# Patient Record
Sex: FEMALE | Employment: FULL TIME | URBAN - METROPOLITAN AREA
[De-identification: names, ages, dates, MRNs, and addresses within clinical notes are randomized per-mention and may not be internally consistent; named-entity substitution may affect disease eponyms.]

---

## 2017-01-30 ENCOUNTER — OFFICE VISIT (OUTPATIENT)
Dept: FAMILY MEDICINE | Facility: CLINIC | Age: 37
End: 2017-01-30
Payer: COMMERCIAL

## 2017-01-30 VITALS
WEIGHT: 170.9 LBS | TEMPERATURE: 97.3 F | OXYGEN SATURATION: 100 % | HEART RATE: 100 BPM | HEIGHT: 63 IN | DIASTOLIC BLOOD PRESSURE: 80 MMHG | SYSTOLIC BLOOD PRESSURE: 119 MMHG | BODY MASS INDEX: 30.28 KG/M2

## 2017-01-30 DIAGNOSIS — B37.31 VULVOVAGINAL CANDIDIASIS: ICD-10-CM

## 2017-01-30 DIAGNOSIS — J01.90 ACUTE SINUSITIS WITH SYMPTOMS > 10 DAYS: ICD-10-CM

## 2017-01-30 DIAGNOSIS — R63.5 WEIGHT GAIN: Primary | ICD-10-CM

## 2017-01-30 PROCEDURE — 36415 COLL VENOUS BLD VENIPUNCTURE: CPT | Performed by: PHYSICIAN ASSISTANT

## 2017-01-30 PROCEDURE — 80061 LIPID PANEL: CPT | Performed by: PHYSICIAN ASSISTANT

## 2017-01-30 PROCEDURE — 84443 ASSAY THYROID STIM HORMONE: CPT | Performed by: PHYSICIAN ASSISTANT

## 2017-01-30 PROCEDURE — 99214 OFFICE O/P EST MOD 30 MIN: CPT | Performed by: PHYSICIAN ASSISTANT

## 2017-01-30 RX ORDER — FLUCONAZOLE 150 MG/1
150 TABLET ORAL ONCE
Qty: 1 TABLET | Refills: 0 | Status: SHIPPED | OUTPATIENT
Start: 2017-01-30 | End: 2017-01-30

## 2017-01-30 RX ORDER — DOXYCYCLINE 100 MG/1
100 CAPSULE ORAL 2 TIMES DAILY
Qty: 20 CAPSULE | Refills: 0 | Status: SHIPPED | OUTPATIENT
Start: 2017-01-30 | End: 2017-06-30

## 2017-01-30 NOTE — PROGRESS NOTES
SUBJECTIVE:                                                    Reyna Zhang is a 36 year old female who presents to clinic today for the following health issues:    Acute Illness   Acute illness concerns: Sinus infection  Onset: 2 weeks ago     Fever: no     Chills/Sweats: YES    Headache (location?): YES    Sinus Pressure:YES    Conjunctivitis:  no    Ear Pain: YES: both    Rhinorrhea: YES    Congestion: YES    Sore Throat: YES- only in the morning     Cough: YES    Wheeze: no     Decreased Appetite: YES    Nausea: no     Vomiting: no     Diarrhea:  no     Dysuria/Freq.: no     Fatigue/Achiness: YES    Sick/Strep Exposure: no      Therapies Tried and outcome: Benadryl and it helped a little bit a few nights ago     ## She also complains of weight gain of 30-40 pounds over the past     Questions/Concerns: Well check up question regarding blood work and fasting.      Problem list and histories reviewed & adjusted, as indicated.  Additional history: as documented    Patient Active Problem List   Diagnosis     Plantar warts     Carpal tunnel syndrome of left wrist     Neck pain     Cervical radiculopathy     Pain of left upper extremity     Pain of right upper extremity     History reviewed. No pertinent past surgical history.    Social History   Substance Use Topics     Smoking status: Never Smoker      Smokeless tobacco: Never Used     Alcohol Use: Yes      Comment: 0-1/week     Family History   Problem Relation Age of Onset     Depression Mother      Glaucoma Mother      Retinal detachment Mother      CANCER Father      Depression Father      Retinal detachment Father      DIABETES Sister      Depression Sister      Macular Degeneration No family hx of          Current Outpatient Prescriptions   Medication Sig Dispense Refill     amitriptyline (ELAVIL) 25 MG tablet Take 1 tablet (25 mg) by mouth At Bedtime 90 tablet 1     order for DME Equipment being ordered: carpal tunnel brace bilateral 2 Units 1  "    Allergies   Allergen Reactions     Macrobid [Nitrofurantoin]      Sulfa Drugs      Amoxicillin Rash       ROS:  C: + weight gain  INTEGUMENTARY/SKIN: NEGATIVE for worrisome rashes, moles or lesions  E/M: + sinus problems, runny nose and congestion  R: + for cough  CV: NEGATIVE for chest pain, palpitations or peripheral edema  GI: NEGATIVE for nausea, abdominal pain, heartburn, or change in bowel habits  : NEGATIVE for dysuria, hematuria, decreased urinary stream or discharge  MUSCULOSKELETAL: NEGATIVE for significant arthralgias or myalgia  NEURO: NEGATIVE for weakness, dizziness or paresthesias  ENDOCRINE: + for cold intolerance, NO HX diabetes and HX thyroid disease  HEME/ALLERGY/IMMUNE: NEGATIVE for swollen nodes      OBJECTIVE:                                                    /80 mmHg  Pulse 100  Temp(Src) 97.3  F (36.3  C) (Oral)  Ht 5' 3.39\" (1.61 m)  Wt 170 lb 14.4 oz (77.52 kg)  BMI 29.91 kg/m2  SpO2 100%  Body mass index is 29.91 kg/(m^2).  GENERAL: healthy, alert and no distress  EYES: Eyes grossly normal to inspection, PERRL and conjunctivae and sclerae normal  HENT: normal cephalic/atraumatic, ear canals and TM's normal, nasal mucosa edematous , oropharynx clear and oral mucous membranes moist  NECK: no adenopathy, no asymmetry, masses, or scars and thyroid normal to palpation  RESP: lungs clear to auscultation - no rales, rhonchi or wheezes  CV: regular rate and rhythm, normal S1 S2, no S3 or S4, no murmur, click or rub, no peripheral edema and peripheral pulses strong  ABDOMEN: soft, nontender, no hepatosplenomegaly, no masses and bowel sounds normal  MS: no gross musculoskeletal defects noted, no edema    Diagnostic Test Results:  No results found for this or any previous visit (from the past 24 hour(s)).     ASSESSMENT/PLAN:                                                    1. Weight gain  Patient's nutritionist suggests thyroid testing for weight gain, agree with plan.   - Lipid " Profile (Chol, Trig, HDL, LDL calc)  - TSH with free T4 reflex    2. Acute sinusitis with symptoms > 10 days  Push fluids and rest. Nasal rinses, humidified air at night.     - doxycycline (VIBRAMYCIN) 100 MG capsule; Take 1 capsule (100 mg) by mouth 2 times daily  Dispense: 20 capsule; Refill: 0    3. Vulvovaginal candidiasis  Patient states that she usually gets yeast infections with ABX usage. Advised that she take probiotic daily. Do NOT take meds prophylactically, take at the first sign of yeast infection.   - fluconazole (DIFLUCAN) 150 MG tablet; Take 1 tablet (150 mg) by mouth once for 1 dose  Dispense: 1 tablet; Refill: 0  I have discussed the patient's diagnosis, and my plan of treatment with the patient and/or family. Patient is aware to come back in with worsening symptoms or if no relief despite treatment plan.  Patient voiced understanding and had no further questions.     Shannon Lawson PA-C  [facility]

## 2017-01-30 NOTE — NURSING NOTE
"Chief Complaint   Patient presents with     Sinus Problem       Initial /80 mmHg  Pulse 100  Temp(Src) 97.3  F (36.3  C) (Oral)  Ht 5' 3.39\" (1.61 m)  Wt 170 lb 14.4 oz (77.52 kg)  BMI 29.91 kg/m2  SpO2 100% Estimated body mass index is 29.91 kg/(m^2) as calculated from the following:    Height as of this encounter: 5' 3.39\" (1.61 m).    Weight as of this encounter: 170 lb 14.4 oz (77.52 kg).  BP completed using cuff size: zackary Romero MA      "

## 2017-01-30 NOTE — MR AVS SNAPSHOT
"              After Visit Summary   1/30/2017    Reyna Zhang    MRN: [id number]           Patient Information     Date Of Birth          1980        Visit Information        Provider Department      1/30/2017 10:20 AM Shannon Lawson PA-C [facility]        Today's Diagnoses     Weight gain    -  1     Acute sinusitis with symptoms > 10 days         Vulvovaginal candidiasis            Follow-ups after your visit        Who to contact     If you have questions or need follow up information about today's clinic visit or your schedule please contact [facility] directly at [phone].  Normal or non-critical lab and imaging results will be communicated to you by [employer], letter or phone within 4 business days after the clinic has received the results. If you do not hear from us within 7 days, please contact the clinic through [employer] or phone. If you have a critical or abnormal lab result, we will notify you by phone as soon as possible.  Submit refill requests through [employer] or call your pharmacy and they will forward the refill request to us. Please allow 3 business days for your refill to be completed.          Additional Information About Your Visit        MyChart Information     HouseTab gives you secure access to your electronic health record. If you see a primary care provider, you can also send messages to your care team and make appointments. If you have questions, please call your primary care clinic.  If you do not have a primary care provider, please call [phone] and they will assist you.        Care EveryWhere ID     This is your Care EveryWhere ID. This could be used by other organizations to access your [place] medical records  [id number]        Your Vitals Were     Pulse Temperature Height BMI (Body Mass Index) Pulse Oximetry       100 97.3  F (36.3  C) (Oral) 5' 3.39\" (1.61 m) 29.91 kg/m2 100%        Blood Pressure from Last 3 Encounters: "   01/30/17 119/80   09/30/16 119/81   06/21/16 120/80    Weight from Last 3 Encounters:   01/30/17 170 lb 14.4 oz (77.52 kg)   09/30/16 180 lb (81.647 kg)   06/21/16 176 lb 9.6 oz (80.105 kg)              We Performed the Following     Lipid Profile (Chol, Trig, HDL, LDL calc)     TSH with free T4 reflex          Today's Medication Changes          These changes are accurate as of: 1/30/17 10:43 AM.  If you have any questions, ask your nurse or doctor.               Start taking these medicines.        Dose/Directions    doxycycline 100 MG capsule   Commonly known as:  VIBRAMYCIN   Used for:  Acute sinusitis with symptoms > 10 days   Started by:  Shannon Lawson PA-C        Dose:  100 mg   Take 1 capsule (100 mg) by mouth 2 times daily   Quantity:  20 capsule   Refills:  0       fluconazole 150 MG tablet   Commonly known as:  DIFLUCAN   Used for:  Vulvovaginal candidiasis   Started by:  Shannon Lawson PA-C        Dose:  150 mg   Take 1 tablet (150 mg) by mouth once for 1 dose   Quantity:  1 tablet   Refills:  0            Where to get your medicines      These medications were sent to [facility] [id number] [place] [address] AT [address] [address]     Phone:  [phone]    - doxycycline 100 MG capsule  - fluconazole 150 MG tablet             Primary Care Provider Office Phone # Fax #    DARIUS Diamond [phone]       [facility] [address]  [facility] [address]        Thank you!     Thank you for choosing [facility]  for your care. Our goal is always to provide you with excellent care. Hearing back from our patients is one way we can continue to improve our services. Please take a few minutes to complete the written survey that you may receive in the mail after your visit with us. Thank you!             Your Updated Medication List - Protect others around you: Learn how to safely  use, store and throw away your medicines at www.disposemymeds.org.          This list is accurate as of: 1/30/17 10:43 AM.  Always use your most recent med list.                   Brand Name Dispense Instructions for use    amitriptyline 25 MG tablet    ELAVIL    90 tablet    Take 1 tablet (25 mg) by mouth At Bedtime       doxycycline 100 MG capsule    VIBRAMYCIN    20 capsule    Take 1 capsule (100 mg) by mouth 2 times daily       fluconazole 150 MG tablet    DIFLUCAN    1 tablet    Take 1 tablet (150 mg) by mouth once for 1 dose       order for DME     2 Units    Equipment being ordered: carpal tunnel brace bilateral

## 2017-01-31 LAB
CHOLEST SERPL-MCNC: 156 MG/DL
HDLC SERPL-MCNC: 39 MG/DL
LDLC SERPL CALC-MCNC: 105 MG/DL
NONHDLC SERPL-MCNC: 117 MG/DL
TRIGL SERPL-MCNC: 58 MG/DL
TSH SERPL DL<=0.005 MIU/L-ACNC: 1.69 MU/L (ref 0.4–4)

## 2017-06-30 ENCOUNTER — NURSE TRIAGE (OUTPATIENT)
Dept: NURSING | Facility: CLINIC | Age: 37
End: 2017-06-30

## 2017-06-30 ENCOUNTER — OFFICE VISIT (OUTPATIENT)
Dept: FAMILY MEDICINE | Facility: CLINIC | Age: 37
End: 2017-06-30
Payer: COMMERCIAL

## 2017-06-30 VITALS
SYSTOLIC BLOOD PRESSURE: 108 MMHG | DIASTOLIC BLOOD PRESSURE: 66 MMHG | HEART RATE: 87 BPM | TEMPERATURE: 98.3 F | OXYGEN SATURATION: 98 % | BODY MASS INDEX: 26.42 KG/M2 | RESPIRATION RATE: 16 BRPM | WEIGHT: 151 LBS

## 2017-06-30 DIAGNOSIS — F40.243 FLYING PHOBIA: Primary | ICD-10-CM

## 2017-06-30 PROCEDURE — 99213 OFFICE O/P EST LOW 20 MIN: CPT | Performed by: NURSE PRACTITIONER

## 2017-06-30 RX ORDER — ALPRAZOLAM 0.25 MG
.25-.5 TABLET ORAL EVERY 6 HOURS PRN
Qty: 20 TABLET | Refills: 0 | Status: SHIPPED | OUTPATIENT
Start: 2017-06-30 | End: 2017-11-01

## 2017-06-30 NOTE — MR AVS SNAPSHOT
After Visit Summary   6/30/2017    Reyna Zhang    MRN: [id number]           Patient Information     Date Of Birth          1980        Visit Information        Provider Department      6/30/2017 4:00 PM Mamta Vale APRN CNP [facility]        Today's Diagnoses     Flying phobia    -  1      Care Instructions    1.  Xanax as needed for anxiety surrounding flying.  Do not drive after taking, causes drowsiness and impaired judgment.  Follow up in the office for refills.            Follow-ups after your visit        Who to contact     If you have questions or need follow up information about today's clinic visit or your schedule please contact [facility] directly at [phone].  Normal or non-critical lab and imaging results will be communicated to you by [employer]hart, letter or phone within 4 business days after the clinic has received the results. If you do not hear from us within 7 days, please contact the clinic through [employer] or phone. If you have a critical or abnormal lab result, we will notify you by phone as soon as possible.  Submit refill requests through [employer] or call your pharmacy and they will forward the refill request to us. Please allow 3 business days for your refill to be completed.          Additional Information About Your Visit        MyChart Information     [employer] gives you secure access to your electronic health record. If you see a primary care provider, you can also send messages to your care team and make appointments. If you have questions, please call your primary care clinic.  If you do not have a primary care provider, please call [phone] and they will assist you.        Care EveryWhere ID     This is your Care EveryWhere ID. This could be used by other organizations to access your [place] medical records  [id number]        Your Vitals Were     Pulse Temperature Respirations Pulse Oximetry BMI (Body Mass Index)        87 98.3  F (36.8  C) (Tympanic) 16 98% 26.42 kg/m2        Blood Pressure from Last 3 Encounters:   06/30/17 108/66   01/30/17 119/80   09/30/16 119/81    Weight from Last 3 Encounters:   06/30/17 151 lb (68.5 kg)   01/30/17 170 lb 14.4 oz (77.5 kg)   09/30/16 180 lb (81.6 kg)              Today, you had the following     No orders found for display         Today's Medication Changes          These changes are accurate as of: 6/30/17  4:27 PM.  If you have any questions, ask your nurse or doctor.               Start taking these medicines.        Dose/Directions    ALPRAZolam 0.25 MG tablet   Commonly known as:  XANAX   Used for:  Flying phobia   Started by:  Mamta Vale APRN CNP        Dose:  0.25-0.5 mg   Take 1-2 tablets (0.25-0.5 mg) by mouth every 6 hours as needed for anxiety   Quantity:  20 tablet   Refills:  0            Where to get your medicines      Some of these will need a paper prescription and others can be bought over the counter.  Ask your nurse if you have questions.     Bring a paper prescription for each of these medications     ALPRAZolam 0.25 MG tablet                Primary Care Provider Office Phone # Fax #    Juana DARIUS Calabrese [phone]       [facility] [address] [facility] [address]  [facility] [address]        Equal Access to Services     QUYNH CHOI AH: Hadkerimt gaspar Sochani, waaxda luqadaha, qaybta kaalmada adegertrudeyada, lam ryan. So [facility] [phone].    ATENCIÓN: Si habla español, tiene a martinez disposición servicios gratuitos de asistencia lingüística. Llame al [phone].    We comply with applicable federal civil rights laws and [place] laws. We do not discriminate on the basis of race, color, national origin, age, disability sex, sexual orientation or gender identity.            Thank you!     Thank you for choosing [facility]  for your care. Our goal is always to provide you with excellent  care. Hearing back from our patients is one way we can continue to improve our services. Please take a few minutes to complete the written survey that you may receive in the mail after your visit with us. Thank you!             Your Updated Medication List - Protect others around you: Learn how to safely use, store and throw away your medicines at www.disposemymeds.org.          This list is accurate as of: 6/30/17  4:27 PM.  Always use your most recent med list.                   Brand Name Dispense Instructions for use Diagnosis    ALPRAZolam 0.25 MG tablet    XANAX    20 tablet    Take 1-2 tablets (0.25-0.5 mg) by mouth every 6 hours as needed for anxiety    Flying phobia       amitriptyline 25 MG tablet    ELAVIL    90 tablet    Take 1 tablet (25 mg) by mouth At Bedtime    Cervicalgia

## 2017-06-30 NOTE — TELEPHONE ENCOUNTER
Seeking medication for anxiety/ she has to fly out tomorrow/transferred to scheduling  Jhon Arevalo RN [facility] [phone]

## 2017-06-30 NOTE — PATIENT INSTRUCTIONS
1.  Xanax as needed for anxiety surrounding flying.  Do not drive after taking, causes drowsiness and impaired judgment.  Follow up in the office for refills.

## 2017-06-30 NOTE — NURSING NOTE
"Chief Complaint   Patient presents with     Anxiety       Initial /66 (BP Location: Left arm, Patient Position: Chair, Cuff Size: Adult Regular)  Pulse 87  Temp 98.3  F (36.8  C) (Tympanic)  Resp 16  Wt 151 lb (68.5 kg)  SpO2 98%  BMI 26.42 kg/m2 Estimated body mass index is 26.42 kg/(m^2) as calculated from the following:    Height as of 1/30/17: 5' 3.39\" (1.61 m).    Weight as of this encounter: 151 lb (68.5 kg).  Medication Reconciliation: complete     Philly Demarco, CMA    "

## 2017-06-30 NOTE — PROGRESS NOTES
SUBJECTIVE:                                                    Reyna Zhang is a 37 year old female who presents to clinic today for the following health issues:      Anxiety when flying      Duration: years    Description (location/character/radiation): worsened with most recent trip to and from [place], returned yesterday    Intensity:  severe    History (similar episodes/previous evaluation): has been seen years ago for this    Precipitating or alleviating factors: None    Therapies tried and outcome: has taken Xanax 0.5mg in past, has been years     Anxiety symptoms actually started 2 nights prior to flight, was hard to sleep.  Felt keyed up, tense, had headache.  Hard to focus and calm down.  On the flight had symptoms of racing heart, thinking about all the things could go wrong, spinning out.  On the way back a good lora sitting next to her read her the travel magazine.    36h of travel planned in the next few days.  She travels a lot, works as a professor, goes to a lot of conferences.  Not from [place], travels to see family.  Last year was on 18 flights, did it without any meds.  [place] [facility] [place].    Traveling without kid, that is hard.  No anxiety symptoms outside of flying.  Not impacting work or home life.      Has been in therapy before.    Does not drink alcohol.    Patient Active Problem List   Diagnosis     Plantar warts     Carpal tunnel syndrome of left wrist     Neck pain     Cervical radiculopathy     Pain of left upper extremity     Pain of right upper extremity     History reviewed. No pertinent surgical history.    Social History   Substance Use Topics     Smoking status: Never Smoker     Smokeless tobacco: Never Used     Alcohol use Yes      Comment: 0-1/week     Family History   Problem Relation Age of Onset     Depression Mother      Glaucoma Mother      Retinal detachment Mother      CANCER Father      Depression Father      Retinal detachment Father      DIABETES Sister      Depression  Sister      Macular Degeneration No family hx of          Current Outpatient Prescriptions   Medication Sig Dispense Refill     ALPRAZolam (XANAX) 0.25 MG tablet Take 1-2 tablets (0.25-0.5 mg) by mouth every 6 hours as needed for anxiety 20 tablet 0     amitriptyline (ELAVIL) 25 MG tablet Take 1 tablet (25 mg) by mouth At Bedtime (Patient not taking: Reported on 6/30/2017) 90 tablet 1       ROS:  Psych as above, otherwise negative       OBJECTIVE:                                                    /66 (BP Location: Left arm, Patient Position: Chair, Cuff Size: Adult Regular)  Pulse 87  Temp 98.3  F (36.8  C) (Tympanic)  Resp 16  Wt 151 lb (68.5 kg)  SpO2 98%  BMI 26.42 kg/m2   GENERAL APPEARANCE: healthy, alert and no distress  EYES: Eyes grossly normal to inspection and conjunctivae and sclerae normal  RESP: respirations nonlabored  PSYCH: mentation appears normal and affect normal/bright        ASSESSMENT/PLAN:                                                    (F40.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  Reviewed dosing ad side effects of drowsiness and impaired judgment, she will not drive after taking.  Ok to use night before flying to help with anxiety as well.  Discussed needs follow up office visit for refills.          See Patient Instructions    Mamta Vale, [facility]    Patient Instructions   1.  Xanax as needed for anxiety surrounding flying.  Do not drive after taking, causes drowsiness and impaired judgment.  Follow up in the office for refills.

## 2017-11-01 ENCOUNTER — OFFICE VISIT (OUTPATIENT)
Dept: FAMILY MEDICINE | Facility: CLINIC | Age: 37
End: 2017-11-01
Payer: COMMERCIAL

## 2017-11-01 VITALS
BODY MASS INDEX: 25.78 KG/M2 | SYSTOLIC BLOOD PRESSURE: 110 MMHG | DIASTOLIC BLOOD PRESSURE: 78 MMHG | OXYGEN SATURATION: 99 % | TEMPERATURE: 98.4 F | HEART RATE: 103 BPM | WEIGHT: 147.3 LBS

## 2017-11-01 DIAGNOSIS — F40.243 FLYING PHOBIA: ICD-10-CM

## 2017-11-01 DIAGNOSIS — N62 HYPERTROPHY OF BREAST: ICD-10-CM

## 2017-11-01 DIAGNOSIS — Z23 NEED FOR PROPHYLACTIC VACCINATION AND INOCULATION AGAINST INFLUENZA: ICD-10-CM

## 2017-11-01 DIAGNOSIS — M54.2 NECK PAIN: Primary | ICD-10-CM

## 2017-11-01 PROCEDURE — 90471 IMMUNIZATION ADMIN: CPT | Performed by: NURSE PRACTITIONER

## 2017-11-01 PROCEDURE — 99214 OFFICE O/P EST MOD 30 MIN: CPT | Mod: 25 | Performed by: NURSE PRACTITIONER

## 2017-11-01 PROCEDURE — 90686 IIV4 VACC NO PRSV 0.5 ML IM: CPT | Performed by: NURSE PRACTITIONER

## 2017-11-01 RX ORDER — ALPRAZOLAM 0.25 MG
.25-.5 TABLET ORAL EVERY 6 HOURS PRN
Qty: 10 TABLET | Refills: 0 | Status: SHIPPED | OUTPATIENT
Start: 2017-11-01 | End: 2018-02-20

## 2017-11-01 NOTE — PROGRESS NOTES

## 2017-11-01 NOTE — NURSING NOTE
"Chief Complaint   Patient presents with     Consult     Discuss breast reduction surgery       Initial /78 (BP Location: Right arm, Patient Position: Chair, Cuff Size: Adult Regular)  Pulse 103  Temp 98.4  F (36.9  C) (Oral)  Wt 147 lb 4.8 oz (66.8 kg)  SpO2 99%  BMI 25.78 kg/m2 Estimated body mass index is 25.78 kg/(m^2) as calculated from the following:    Height as of 1/30/17: 5' 3.39\" (1.61 m).    Weight as of this encounter: 147 lb 4.8 oz (66.8 kg).  Medication Reconciliation: complete     Kasey Downing CMA    "

## 2017-11-01 NOTE — PROGRESS NOTES
SUBJECTIVE:   Reyna Zhang is a 37 year old female who presents to clinic today for the following health issues:    Would like to discuss possible breast reduction surgery.  - Has had bilateral arm weakness and numbness tingling in fingers for many years, neck pain on and off, and new mid-back pain. Was first treated for bilateral carpal tunnel through [facility] with physical therapy and sports medicine for at least 8 years, which did not improve bilateral arm weakness and paresthesia in hands. In April 2016  began physical therapy for neck pain and radiculopathy, which somewhat improved symptoms. She continued physical therapy for at least 6 months, and was discharged from therapy. Physical therapist noted that her shoulder blades were splayed apart, and conjectured this was due to her large breasts. Neck pain is now stable, as long as she refrains from physical activity, but has noticed daily tightness and soreness in bilateral trapezius and in mid back in parathoracic muscles.   Has reduced weight from 170 to 147 over the past 11 months by working intentionally with a nutritionist. Currently wearing between a 36 GG and a 34 K size bra.    Has difficulty with many physical activities due to pain. Difficulty running, jump rope, biking  and playing soccer.  Has fear of flying- has taken xanax with flights with relief in the past, and would like a refill due to upcoming vacations over [facility],      Problem list and histories reviewed & adjusted, as indicated.  Additional history: as documented    Patient Active Problem List   Diagnosis     Plantar warts     Carpal tunnel syndrome of left wrist     Neck pain     Cervical radiculopathy     Pain of left upper extremity     Pain of right upper extremity     No past surgical history on file.    Social History   Substance Use Topics     Smoking status: Never Smoker     Smokeless tobacco: Never Used     Alcohol use Yes      Comment: 0-1/week     Family  History   Problem Relation Age of Onset     Depression Mother      Glaucoma Mother      Retinal detachment Mother      CANCER Father      Depression Father      Retinal detachment Father      DIABETES Sister      Depression Sister      Macular Degeneration No family hx of              Reviewed and updated as needed this [facility]  Meds       Reviewed and updated as needed this visit by Provider         ROS:  Constitutional, HEENT, cardiovascular, pulmonary, gi and gu systems are negative, except as otherwise noted.      OBJECTIVE:   /78 (BP Location: Right arm, Patient Position: Chair, Cuff Size: Adult Regular)  Pulse 103  Temp 98.4  F (36.9  C) (Oral)  Wt 147 lb 4.8 oz (66.8 kg)  SpO2 99%  BMI 25.78 kg/m2  Body mass index is 25.78 kg/(m^2).   GENERAL: healthy, alert and no distress  NECK: no adenopathy, no asymmetry, masses, or scars and thyroid normal to palpation  RESP: lungs clear to auscultation - no rales, rhonchi or wheezes  BREAST: large breasts for height; breast exam not performed  CV: regular rate and rhythm, normal S1 S2, no S3 or S4, no murmur, click or rub, no peripheral edema and peripheral pulses strong  MS: neck exam shows normal strength, no torticollis and ROM is normal and spine exam shows tenderness at parathoracic muscles, hunched posture  Very tight trapezius muscles bilaterally.  Diagnostic Test Results:  No results found for this or any previous visit (from the past 24 hour(s)).    ASSESSMENT/PLAN:     Problem List Items Addressed This Visit     Neck pain - Primary      Other Visit Diagnoses     Hypertrophy of breast        Relevant Orders    BREAST CENTER REFERRAL    Flying phobia        Relevant Medications    ALPRAZolam (XANAX) 0.25 MG tablet    Need for prophylactic vaccination and inoculation against influenza        Relevant Orders    FLU VAC, SPLIT VIRUS IM > 3 YO (QUADRIVALENT) [94764] (Completed)    Vaccine Administration, Initial [95318]  (Completed)         Discussed having her see a breast surgeon to discuss possibility of reduction. Given she has already had months of physical therapy and been discharged, lost a significant amount of weight, and still has large breasts for her frame, I think it would be reasonable to assume that her lerge breasts may be exacerbating her upper back and neck pain, and that she may benefit from breast reduction.  DARIUS Diamond [facility]  Please note greater than 50% of this 30 minute appointment were spent in counseling with the patient of the issues described above in the history of present illness and in the plan, including collecting history

## 2017-11-01 NOTE — MR AVS SNAPSHOT
After Visit Summary   11/1/2017    Reyna Zhang    MRN: [id number]           Patient Information     Date Of Birth          1980        Visit Information        Provider Department      11/1/2017 2:00 PM Juana Ro APRN CNP [facility]        Today's Diagnoses     Neck pain    -  1    Hypertrophy of breast        Flying phobia           Follow-ups after your visit        Additional Services     BREAST CENTER REFERRAL       Your provider has referred you to: FMG: [facility] [place] [facility] [place] [phone]   [email or web address]/  FMG: [facility] [phone]   [email or web address]  FMG: [facility] [phone]   [email or web address]/  FMG: [facility] [phone]   [email or web address]/Clinics/[facility]/  [facility]: [facility] Center at [facility] [phone]   [email or web address]    Please be aware that coverage of these services is subject to the terms and limitations of your health insurance plan.  Call member services at your health plan with any benefit or coverage questions.      Please bring the following with you to your appointment:    (1) Any X-Rays, CTs or MRIs which have been performed.  Contact the facility where they were done to arrange for  prior to your scheduled appointment.    (2) List of current medications   (3) This referral request   (4) Any documents/labs given to you for this referral                  Who to contact     If you have questions or need follow up information about today's clinic visit or your schedule please contact [facility] directly at  [phone].  Normal or non-critical lab and imaging results will be communicated to you by DocASAPhart, letter or phone within 4 business days after the clinic has received the results. If you do not hear from us within 7 days, please contact the clinic through DocASAPhart or phone. If you have a critical or abnormal lab result, we will notify you by phone as soon as possible.  Submit refill requests through [employer] or call your pharmacy and they will forward the refill request to us. Please allow 3 business days for your refill to be completed.          Additional Information About Your Visit        DocASAPhart Information     [employer] gives you secure access to your electronic health record. If you see a primary care provider, you can also send messages to your care team and make appointments. If you have questions, please call your primary care clinic.  If you do not have a primary care provider, please call [phone] and they will assist you.        Care EveryWhere ID     This is your Care EveryWhere ID. This could be used by other organizations to access your [place] medical records  [id number]        Your Vitals Were     Pulse Temperature Pulse Oximetry BMI (Body Mass Index)          103 98.4  F (36.9  C) (Oral) 99% 25.78 kg/m2         Blood Pressure from Last 3 Encounters:   11/01/17 110/78   06/30/17 108/66   01/30/17 119/80    Weight from Last 3 Encounters:   11/01/17 147 lb 4.8 oz (66.8 kg)   06/30/17 151 lb (68.5 kg)   01/30/17 170 lb 14.4 oz (77.5 kg)              We Performed the Following     BREAST CENTER REFERRAL          Today's Medication Changes          These changes are accurate as of: 11/1/17  2:38 PM.  If you have any questions, ask your nurse or doctor.               Stop taking these medicines if you haven't already. Please contact your care team if you have questions.     amitriptyline 25 MG tablet   Commonly known as:  ELAVIL                Where to get your medicines      Some of these will  need a paper prescription and others can be bought over the counter.  Ask your nurse if you have questions.     Bring a paper prescription for each of these medications     ALPRAZolam 0.25 MG tablet                Primary Care Provider Office Phone # Fax #    DARIUS Diamond CHRISTOS [phone]       [address] [facility] [address]  [facility] [address]        Equal Access to Services     [facility] STEPH : Hadii aad ku hadasho Soomaali, waaxda luqadaha, qaybta kaalmada adeegyada, waxay idiin hayaan adeeg kharash la'aan . So [facility] [phone].    ATENCIÓN: Si habla espalpesh, tiene a martinez disposición servicios gratuitos de asistencia lingüística. Tamara al [phone].    We comply with applicable federal civil rights laws and [place] laws. We do not discriminate on the basis of race, color, national origin, age, disability, sex, sexual orientation, or gender identity.            Thank you!     Thank you for choosing [facility]  for your care. Our goal is always to provide you with excellent care. Hearing back from our patients is one way we can continue to improve our services. Please take a few minutes to complete the written survey that you may receive in the mail after your visit with us. Thank you!             Your Updated Medication List - Protect others around you: Learn how to safely use, store and throw away your medicines at www.disposemymeds.org.          This list is accurate as of: 11/1/17  2:38 PM.  Always use your most recent med list.                   Brand Name Dispense Instructions for use Diagnosis    ALPRAZolam 0.25 MG tablet    XANAX    10 tablet    Take 1-2 tablets (0.25-0.5 mg) by mouth every 6 hours as needed for anxiety    Flying phobia

## 2017-11-29 ENCOUNTER — OFFICE VISIT (OUTPATIENT)
Dept: FAMILY MEDICINE | Facility: CLINIC | Age: 37
End: 2017-11-29
Payer: COMMERCIAL

## 2017-11-29 VITALS
RESPIRATION RATE: 18 BRPM | HEART RATE: 110 BPM | OXYGEN SATURATION: 98 % | WEIGHT: 150 LBS | DIASTOLIC BLOOD PRESSURE: 72 MMHG | TEMPERATURE: 97.9 F | SYSTOLIC BLOOD PRESSURE: 104 MMHG | BODY MASS INDEX: 26.25 KG/M2

## 2017-11-29 DIAGNOSIS — J01.01 ACUTE RECURRENT MAXILLARY SINUSITIS: Primary | ICD-10-CM

## 2017-11-29 DIAGNOSIS — J18.9 PNEUMONIA DUE TO INFECTIOUS ORGANISM, UNSPECIFIED LATERALITY, UNSPECIFIED PART OF LUNG: ICD-10-CM

## 2017-11-29 PROCEDURE — 99213 OFFICE O/P EST LOW 20 MIN: CPT | Performed by: NURSE PRACTITIONER

## 2017-11-29 RX ORDER — DOXYCYCLINE 100 MG/1
100 CAPSULE ORAL 2 TIMES DAILY
Qty: 20 CAPSULE | Refills: 0 | Status: SHIPPED | OUTPATIENT
Start: 2017-11-29 | End: 2018-02-06

## 2017-11-29 RX ORDER — FLUCONAZOLE 150 MG/1
150 TABLET ORAL ONCE
Qty: 2 TABLET | Refills: 0 | Status: SHIPPED | OUTPATIENT
Start: 2017-11-29 | End: 2017-11-29

## 2017-11-29 NOTE — PROGRESS NOTES
"  SUBJECTIVE:   Reyna Zhang is a 37 year old female who presents to clinic today for the following health issues:    Acute Illness   Acute illness concerns: head cold  Onset: x 10 days    Fever: low grade fever  degrees    Chills/Sweats: \"temperature off\"    Headache (location?): no    Sinus Pressure:YES    Conjunctivitis:  no    Ear Pain: pressure    Rhinorrhea: YES    Congestion: YES    Sore Throat: YES- feels like it's due to nasal drip     Cough: YES    Wheeze: no    Decreased Appetite: some days    Nausea: no    Vomiting: no    Diarrhea:  no    Dysuria/Freq.: no    Fatigue/Achiness: YES    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  Additional history: as documented    Patient Active Problem List   Diagnosis     Plantar warts     Carpal tunnel syndrome of left wrist     Neck pain     Cervical radiculopathy     Pain of left upper extremity     Pain of right upper extremity     History reviewed. No pertinent surgical history.    Social History   Substance Use Topics     Smoking status: Never Smoker     Smokeless tobacco: Never Used     Alcohol use Yes      Comment: 0-1/week     Family History   Problem Relation Age of Onset     Depression Mother      Glaucoma Mother      Retinal detachment Mother      CANCER Father      Depression Father      Retinal detachment Father      DIABETES Sister      Depression Sister      Macular Degeneration No family hx of              Reviewed and updated as needed this visit by clinical staffTobacco  Allergies  Meds  Med Hx  Surg Hx  Fam Hx  Soc Hx      Reviewed and updated as needed this visit by Provider         ROS:  Constitutional, HEENT, cardiovascular, pulmonary, GI, , musculoskeletal, neuro, skin, endocrine and psych systems are negative, except as " otherwise noted.      OBJECTIVE:   /72  Pulse 110  Temp 97.9  F (36.6  C) (Oral)  Resp 18  Wt 150 lb (68 kg)  LMP 11/10/2017 (Approximate)  SpO2 98%  BMI 26.25 kg/m2  Body mass index is 26.25 kg/(m^2).   GENERAL: healthy, alert and no distress  HENT: normal cephalic/atraumatic, ear canals and TM's normal, nasal mucosa edematous , oropharynx clear, oral mucous membranes moist and sinuses: maxillary tenderness on right  NECK: bilateral anterior cervical adenopathy, no asymmetry, masses, or scars and thyroid normal to palpation  RESP: lungs clear to auscultation - no rales, rhonchi or wheezes  CV: regular rate and rhythm, normal S1 S2, no S3 or S4, no murmur, click or rub, no peripheral edema and peripheral pulses strong  MS: no gross musculoskeletal defects noted, no edema    Diagnostic Test Results:  none     ASSESSMENT/PLAN:     Problem List Items Addressed This Visit     RESOLVED: Pneumonia    Relevant Medications    doxycycline (VIBRAMYCIN) 100 MG capsule    fluconazole (DIFLUCAN) 150 MG tablet      Other Visit Diagnoses     Acute recurrent maxillary sinusitis    -  Primary    Relevant Medications    doxycycline (VIBRAMYCIN) 100 MG capsule    fluconazole (DIFLUCAN) 150 MG tablet           DARIUS Diamond CNP  [facility]

## 2017-11-29 NOTE — MR AVS SNAPSHOT
After Visit Summary   11/29/2017    Reyna Zhang    MRN: [id number]           Patient Information     Date Of Birth          1980        Visit Information        Provider Department      11/29/2017 3:30 PM Juana Ro APRN [facility]        Today's Diagnoses     Acute recurrent maxillary sinusitis    -  1       Follow-ups after your visit        Your next 10 appointments already scheduled     Feb 06, 2018  1:00 PM CST   (Arrive by 12:45 PM)   New Plastic Surgery with Hannah Ramires MD   [facility] ([facility] [place])    [address]  [address] [facility] [address] [phone]           Do not wear perfume.              Who to contact     If you have questions or need follow up information about today's clinic visit or your schedule please contact [facility] directly at [phone].  Normal or non-critical lab and imaging results will be communicated to you by MyChart, letter or phone within 4 business days after the clinic has received the results. If you do not hear from us within 7 days, please contact the clinic through [employer]com[employer] or phone. If you have a critical or abnormal lab result, we will notify you by phone as soon as possible.  Submit refill requests through [employer] or call your pharmacy and they will forward the refill request to us. Please allow 3 business days for your refill to be completed.          Additional Information About Your Visit        MyChart Information     [employer] gives you secure access to your electronic health record. If you see a primary care provider, you can also send messages to your care team and make appointments. If you have questions, please call your primary care clinic.  If you do not have a primary care provider, please call [phone] and they will assist you.        Care EveryWhere ID     This is your Care EveryWhere ID. This could  be used by other organizations to access your [place] medical records  [id number]        Your Vitals Were     Pulse Temperature Respirations Last Period Pulse Oximetry BMI (Body Mass Index)    110 97.9  F (36.6  C) (Oral) 18 11/10/2017 (Approximate) 98% 26.25 kg/m2       Blood Pressure from Last 3 Encounters:   11/29/17 104/72   11/01/17 110/78   06/30/17 108/66    Weight from Last 3 Encounters:   11/29/17 150 lb (68 kg)   11/01/17 147 lb 4.8 oz (66.8 kg)   06/30/17 151 lb (68.5 kg)              Today, you had the following     No orders found for display         Today's Medication Changes          These changes are accurate as of: 11/29/17  3:32 PM.  If you have any questions, ask your nurse or doctor.               Start taking these medicines.        Dose/Directions    doxycycline 100 MG capsule   Commonly known as:  VIBRAMYCIN   Used for:  Acute recurrent maxillary sinusitis   Started by:  Juana Ro APRN CNP        Dose:  100 mg   Take 1 capsule (100 mg) by mouth 2 times daily   Quantity:  20 capsule   Refills:  0       fluconazole 150 MG tablet   Commonly known as:  DIFLUCAN   Used for:  Acute recurrent maxillary sinusitis   Started by:  Juana Ro APRN CNP        Dose:  150 mg   Take 1 tablet (150 mg) by mouth once for 1 dose   Quantity:  2 tablet   Refills:  0            Where to get your medicines      These medications were sent to [facility] [address] [place] [address] AT [address] [address]     Phone:  [phone]     doxycycline 100 MG capsule    fluconazole 150 MG tablet                Primary Care Provider Office Phone # Fax #    DARIUS Diamond [phone]       [address]  [facility] [address]        Equal Access to Services     QUYNH CHOI AH: Isaura Núñez, wajamaal luqadaha, qaybta kaalmada sheyla, lam ryan. So [facility]  [phone].    ATENCIÓN: Si kyrie sharma, tiene a martinez disposición servicios gratuitos de asistencia lingüística. Tamara zheng [phone].    We comply with applicable federal civil rights laws and [place] laws. We do not discriminate on the basis of race, color, national origin, age, disability, sex, sexual orientation, or gender identity.            Thank you!     Thank you for choosing [facility]  for your care. Our goal is always to provide you with excellent care. Hearing back from our patients is one way we can continue to improve our services. Please take a few minutes to complete the written survey that you may receive in the mail after your visit with us. Thank you!             Your Updated Medication List - Protect others around you: Learn how to safely use, store and throw away your medicines at www.disposemymeds.org.          This list is accurate as of: 11/29/17  3:32 PM.  Always use your most recent med list.                   Brand Name Dispense Instructions for use Diagnosis    ALPRAZolam 0.25 MG tablet    XANAX    10 tablet    Take 1-2 tablets (0.25-0.5 mg) by mouth every 6 hours as needed for anxiety    Flying phobia       doxycycline 100 MG capsule    VIBRAMYCIN    20 capsule    Take 1 capsule (100 mg) by mouth 2 times daily    Acute recurrent maxillary sinusitis       fluconazole 150 MG tablet    DIFLUCAN    2 tablet    Take 1 tablet (150 mg) by mouth once for 1 dose    Acute recurrent maxillary sinusitis

## 2017-11-29 NOTE — NURSING NOTE
"Chief Complaint   Patient presents with     URI     head cold       Initial /72  Pulse 110  Temp 97.9  F (36.6  C) (Oral)  Resp 18  Wt 150 lb (68 kg)  LMP 11/10/2017 (Approximate)  SpO2 98%  BMI 26.25 kg/m2 Estimated body mass index is 26.25 kg/(m^2) as calculated from the following:    Height as of 1/30/17: 5' 3.39\" (1.61 m).    Weight as of this encounter: 150 lb (68 kg).  Medication Reconciliation: complete     Priti Fay MA      "

## 2018-02-06 ENCOUNTER — OFFICE VISIT (OUTPATIENT)
Dept: PLASTIC SURGERY | Facility: CLINIC | Age: 38
End: 2018-02-06
Payer: COMMERCIAL

## 2018-02-06 ENCOUNTER — MYC MEDICAL ADVICE (OUTPATIENT)
Dept: FAMILY MEDICINE | Facility: CLINIC | Age: 38
End: 2018-02-06

## 2018-02-06 VITALS
BODY MASS INDEX: 28.74 KG/M2 | SYSTOLIC BLOOD PRESSURE: 132 MMHG | HEIGHT: 62 IN | HEART RATE: 104 BPM | WEIGHT: 156.2 LBS | DIASTOLIC BLOOD PRESSURE: 86 MMHG | OXYGEN SATURATION: 100 %

## 2018-02-06 DIAGNOSIS — N62 SYMPTOMATIC MAMMARY HYPERTROPHY: Primary | ICD-10-CM

## 2018-02-06 ASSESSMENT — PAIN SCALES - GENERAL: PAINLEVEL: NO PAIN (0)

## 2018-02-06 NOTE — PROGRESS NOTES
"PLASTICS NEW    This is a 37 year old female here to discuss possible breast reduction.  She found us online and is also plugged into the [employer] system.   While she considers herself gender queer, she is not transitioning and is only interested in breast reduction that matches her body habitus.    She wears a fitted bra with wider straps that is HH cup, and wears a K size sports bra.   For any increased activity, she wears a sports bra plus a tight compression shirt, and she uses a bra at nighttime as well.    Despite this added support, she continues to suffer from neck, shoulder and mid-back pain. She also experiences chronic shoulder/arm pain and hand numbness. She has seen physical therapy, sports medicine, chiropractic providers for these complaints but the symptoms persist and they are now wondering if this may be due to the traction from her large pendulous breasts and bra straps. She had an MRI of the neck which showed some C5-6 impingement, but no history of trauma. She has had a normal EMG to rule out carpal tunnel.     For relief, she tries using Magnesium baths, Tiger Blam, Biofreeze, heating pads and limited NSAIDs. She has also been on Gabapentin and Amitryptiline.  She also suffers from increased sweating and rashes in the IMF and regularly wipes this area throughout the day to reduce skin problems.    Her breast size interferes with her previous physical activities such as running, climbing and bicycling.   She has basically been waiting to see someone about breast reduction for the past 20 years.    PMH: symptomatic breast hypertrophy. Denies breast problems. Has some flight anxiety that she takes Xanax for. She has a tentative diagnosis of \"adjustment disorder\". She was actually quite tearful during the visit today about fear of GA.   She denies diabetes, HTN, GERD, bleeding/clotting or healing/scarring problems.   9 year old son - breast fed.    PSH: 3rd molar extractions age 14.     FHX: no " "breast or ovarian CA. Dad and PGF - prostate CA. Sister- depression, obesity. Mom- cardiac, ?PFO corrected with \"patch\" during adulthood, CABG, anxiety. Dad - cardiac stent, depression.  Mental health issues on both sides.    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 Male partner Derek - [employer] and PhD student at [employer]. Non-smoker, minimal ETOH. Diet - vegetarian (grew up in [place]). Eats legumes, tofu, fish, dairy, eggs. Does not drink coffee, some caffeinated teas. Exercise - still tries to play soccer and roller derby. Sleep - averages 8 hours per night, 9 hours would be ideal.    PE: 5' 2.5\", 155 lbs. BSA = 1.75. Schnur scale = 405 gms. Biological female.   Bilateral breast hypertrophy, left at least 200-300 gms larger. Grade 2 ptosis. Very dense tissues on palpation - no masses or nodes. Mild lateral thoracic rolls. Right IMF about 1 cms lower. SN-NAC: 30 cms on left, 28 cms on right. IMF-NAC: 16 cms on left, 14 cms on right. Photos taken with written consent.    A/P: biological female with symptomatic breast hypertrophy. Good candidate for bilateral breast reduction. Estimate at least 400 gms to be removed. Patient will continue to think about desired size. Will need preop mammogram. Understands that will need to submit additional supporting documentation of symptoms for prior auth. Has [employer] so may be additional criteria or slightly different weight removal requirement.  Hoping for surgery in early summer after classes are out.     Total time = 60 minutes, greater than half spent on discussing surgical options, insurance issues and a lot about her fear of anesthesia and difficulty in determining desired breast size.     Any discussions about risks and complications, periop cares and limitations will be discussed at a preop visit within 30 days of surgical date. OK to have partner attend preop visit.   "

## 2018-02-06 NOTE — MR AVS SNAPSHOT
"              After Visit Summary   2/6/2018    Reyna Zhang    MRN: [id number]           Patient Information     Date Of Birth          1980        Visit Information        Provider Department      2/6/2018 1:00 PM Hannah Ramires MD [facility] Plastic and Reconstructive Surgery        Today's Diagnoses     Symptomatic mammary hypertrophy    -  1       Follow-ups after your visit        Who to contact     Please call your clinic at [phone] to:    Ask questions about your health    Make or cancel appointments    Discuss your medicines    Learn about your test results    Speak to your doctor            Additional Information About Your Visit        MyChart Information     [employer] gives you secure access to your electronic health record. If you see a primary care provider, you can also send messages to your care team and make appointments. If you have questions, please call your primary care clinic.  If you do not have a primary care provider, please call [phone] and they will assist you.      [employer] is an electronic gateway that provides easy, online access to your medical records. With [employer], you can request a clinic appointment, read your test results, renew a prescription or communicate with your care team.     To access your existing account, please contact your [facility] or call [phone] for assistance.        Care EveryWhere ID     This is your Care EveryWhere ID. This could be used by other organizations to access your [facility] medical records  [id number]        Your Vitals Were     Pulse Height Pulse Oximetry BMI (Body Mass Index)          104 5' 2\" 100% 28.57 kg/m2         Blood Pressure from Last 3 Encounters:   02/06/18 132/86   11/29/17 104/72   11/01/17 110/78    Weight from Last 3 Encounters:   02/06/18 156 lb 3.2 oz   11/29/17 150 lb   11/01/17 147 lb 4.8 oz              Today, you had the following     No orders found for display       " Primary Care Provider Office Phone # Fax #    Juana Ro, DARIUS SHER [phone]       [address] [facility] [address]  [facility] [address]        Equal Access to Services     QUYNH CHOI : Hadii sylvain ku hadasho Soomaali, waaxda luqadaha, qaybta kaalmada adeegyada, waxrajan blackwelln donna alvarado lasegundo ryan. So [facility] [phone].    ATENCIÓN: Si habla español, tiene a martinez disposición servicios gratuitos de asistencia lingüística. Llame al [phone].    We comply with applicable federal civil rights laws and [place] laws. We do not discriminate on the basis of race, color, national origin, age, disability, sex, sexual orientation, or gender identity.            Thank you!     Thank you for choosing [facility] PLASTIC AND RECONSTRUCTIVE SURGERY  for your care. Our goal is always to provide you with excellent care. Hearing back from our patients is one way we can continue to improve our services. Please take a few minutes to complete the written survey that you may receive in the mail after your visit with us. Thank you!             Your Updated Medication List - Protect others around you: Learn how to safely use, store and throw away your medicines at www.disposemymeds.org.          This list is accurate as of 2/6/18 11:59 PM.  Always use your most recent med list.                   Brand Name Dispense Instructions for use Diagnosis    ALPRAZolam 0.25 MG tablet    XANAX    10 tablet    Take 1-2 tablets (0.25-0.5 mg) by mouth every 6 hours as needed for anxiety    Flying phobia

## 2018-02-06 NOTE — LETTER
[facility]  [address] [place] [address] [address]  [facility] [id number] [phone]          February 7, 2018    Reyna Zhang                                                                                                                     [address] [address]            To Whom It May Concern:    Reyna Zhang is under my medical care.  I have been treating Reyna Zhang for chronic neck pain, bilateral arm weakness and paresthesia for three years. These symptoms  have persisted despite physical therapy, weight loss, nutritional changes. The symptoms interfere with daily work, participation in athletic activities, and cause daily pain and discomfort. I believe that breast hypertrophy contributes to her chronic neck pain and bilateral paresthesias in arms, and a breast reduction may help to alleviate her symptoms. Please contact me with any questions or concerns.         Sincerely,           Juana Ro CNP

## 2018-02-06 NOTE — LETTER
2/6/2018       RE: Reyna Zhang  [address] [place] [address]     Dear Colleague,    Thank you for referring your patient, Reyna Zhang, to the [facility] PLASTIC AND RECONSTRUCTIVE SURGERY at [facility]. Please see a copy of my visit note below.    PLASTICS NEW    This is a 37 year old female here to discuss possible breast reduction.  She found us online and is also plugged into the [employer] system.   While she considers herself gender queer, she is not transitioning and is only interested in breast reduction that matches her body habitus.    She wears a fitted bra with wider straps that is HH cup, and wears a K size sports bra.   For any increased activity, she wears a sports bra plus a tight compression shirt, and she uses a bra at nighttime as well.    Despite this added support, she continues to suffer from neck, shoulder and mid-back pain. She also experiences chronic shoulder/arm pain and hand numbness. She has seen physical therapy, sports medicine, chiropractic providers for these complaints but the symptoms persist and they are now wondering if this may be due to the traction from her large pendulous breasts and bra straps. She had an MRI of the neck which showed some C5-6 impingement, but no history of trauma. She has had a normal EMG to rule out carpal tunnel.     For relief, she tries using Magnesium baths, Tiger Blam, Biofreeze, heating pads and limited NSAIDs. She has also been on Gabapentin and Amitryptiline.  She also suffers from increased sweating and rashes in the IMF and regularly wipes this area throughout the day to reduce skin problems.    Her breast size interferes with her previous physical activities such as running, climbing and bicycling.   She has basically been waiting to see someone about breast reduction for the past 20 years.    PMH: symptomatic breast hypertrophy. Denies breast problems. Has some flight anxiety that she takes  "Xanax for. She has a tentative diagnosis of \"adjustment disorder\". She was actually quite tearful during the visit today about fear of GA.   She denies diabetes, HTN, GERD, bleeding/clotting or healing/scarring problems.   9 year old son - breast fed.    PSH: 3rd molar extractions age 14.     FHX: no breast or ovarian CA. Dad and PGF - prostate CA. Sister- depression, obesity. Mom- cardiac, ?PFO corrected with \"patch\" during adulthood, CABG, anxiety. Dad - cardiac stent, depression.  Mental health issues on both sides.    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 Male partner Derek - [employer] and PhD student at [employer]. Non-smoker, minimal ETOH. Diet - vegetarian (grew up in [place]). Eats legumes, tofu, fish, dairy, eggs. Does not drink coffee, some caffeinated teas. Exercise - still tries to play soccer and roller derby. Sleep - averages 8 hours per night, 9 hours would be ideal.    PE: 5' 2.5\", 155 lbs. BSA = 1.75. Schnur scale = 405 gms. Biological female.   Bilateral breast hypertrophy, left at least 200-300 gms larger. Grade 2 ptosis. Very dense tissues on palpation - no masses or nodes. Mild lateral thoracic rolls. Right IMF about 1 cms lower. SN-NAC: 30 cms on left, 28 cms on right. IMF-NAC: 16 cms on left, 14 cms on right. Photos taken with written consent.    A/P: biological female with symptomatic breast hypertrophy. Good candidate for bilateral breast reduction. Estimate at least 400 gms to be removed. Patient will continue to think about desired size. Will need preop mammogram. Understands that will need to submit additional supporting documentation of symptoms for prior auth. Has [employer] so may be additional criteria or slightly different weight removal requirement.  Hoping for surgery in early summer after classes are out.     Total time = 60 minutes, greater than half spent on discussing surgical options, insurance issues and a " lot about her fear of anesthesia and difficulty in determining desired breast size.     Any discussions about risks and complications, periop cares and limitations will be discussed at a preop visit within 30 days of surgical date. OK to have partner attend preop visit.        Hannah Ramires MD

## 2018-02-06 NOTE — Clinical Note
Soniya - will need to find way to get pics to you since not done into EPIC. Also mentioned need for supportive documentation of symptoms from other providers [place] - will need preop mammo. Carli - will wait for preop orders until PA back. Patient hoping for late spring, early summer after classes done.

## 2018-02-06 NOTE — NURSING NOTE
"Chief Complaint   Patient presents with     Consult     breast reduction        Vitals:    02/06/18 1248   BP: 132/86   Pulse: 104   SpO2: 100%   Weight: 156 lb 3.2 oz   Height: 5' 2\"       Body mass index is 28.57 kg/(m^2).      Tony MÉNDEZ                     "

## 2018-02-07 ENCOUNTER — MEDICAL CORRESPONDENCE (OUTPATIENT)
Dept: HEALTH INFORMATION MANAGEMENT | Facility: CLINIC | Age: 38
End: 2018-02-07

## 2018-02-07 NOTE — TELEPHONE ENCOUNTER
Juana,      See pt request for letter    Letter started, please review/revise/advise    Debra Mckeon RN   [facility]

## 2018-02-09 ENCOUNTER — TELEPHONE (OUTPATIENT)
Dept: SURGERY | Facility: CLINIC | Age: 38
End: 2018-02-09

## 2018-02-09 NOTE — TELEPHONE ENCOUNTER
Received letter from Dr Ro, with notes from office on 11/1/17, also received letter from Dr Don at [facility] [employer].  Emailed patient that I received these

## 2018-02-13 ENCOUNTER — TELEPHONE (OUTPATIENT)
Dept: SURGERY | Facility: CLINIC | Age: 38
End: 2018-02-13

## 2018-02-15 ENCOUNTER — MEDICAL CORRESPONDENCE (OUTPATIENT)
Dept: HEALTH INFORMATION MANAGEMENT | Facility: CLINIC | Age: 38
End: 2018-02-15

## 2018-02-16 ENCOUNTER — TELEPHONE (OUTPATIENT)
Dept: SURGERY | Facility: CLINIC | Age: 38
End: 2018-02-16

## 2018-02-16 NOTE — TELEPHONE ENCOUNTER
Received [employer] prior authorization approval -CASE#[id number], left voice message with patient, sent email to care team

## 2018-02-20 RX ORDER — CLINDAMYCIN PHOSPHATE 900 MG/50ML
900 INJECTION, SOLUTION INTRAVENOUS SEE ADMIN INSTRUCTIONS
Status: CANCELLED | OUTPATIENT
Start: 2018-02-20

## 2018-02-20 RX ORDER — CLINDAMYCIN PHOSPHATE 900 MG/50ML
900 INJECTION, SOLUTION INTRAVENOUS
Status: CANCELLED | OUTPATIENT
Start: 2018-02-20

## 2018-03-01 ENCOUNTER — TELEPHONE (OUTPATIENT)
Dept: SURGERY | Facility: CLINIC | Age: 38
End: 2018-03-01

## 2018-03-09 ENCOUNTER — MYC MEDICAL ADVICE (OUTPATIENT)
Dept: FAMILY MEDICINE | Facility: CLINIC | Age: 38
End: 2018-03-09

## 2018-03-09 DIAGNOSIS — Z20.828 EXPOSURE TO INFLUENZA: Primary | ICD-10-CM

## 2018-03-09 RX ORDER — OSELTAMIVIR PHOSPHATE 75 MG/1
75 CAPSULE ORAL DAILY
Qty: 10 CAPSULE | Refills: 0 | Status: SHIPPED | OUTPATIENT
Start: 2018-03-09 | End: 2018-05-24

## 2018-03-09 NOTE — TELEPHONE ENCOUNTER
Savi,     Please see patient's mychart message below. Pharmacy is cued.     Thank you!  CRISTÓBAL Herbert   
11-Nov-2017 15:46

## 2018-05-24 ENCOUNTER — OFFICE VISIT (OUTPATIENT)
Dept: FAMILY MEDICINE | Facility: CLINIC | Age: 38
End: 2018-05-24
Payer: COMMERCIAL

## 2018-05-24 VITALS
WEIGHT: 161.3 LBS | TEMPERATURE: 98.6 F | OXYGEN SATURATION: 100 % | DIASTOLIC BLOOD PRESSURE: 70 MMHG | SYSTOLIC BLOOD PRESSURE: 100 MMHG | BODY MASS INDEX: 29.5 KG/M2 | HEART RATE: 110 BPM

## 2018-05-24 DIAGNOSIS — Z01.818 PREOP GENERAL PHYSICAL EXAM: Primary | ICD-10-CM

## 2018-05-24 DIAGNOSIS — M79.601 PAIN IN BOTH UPPER EXTREMITIES: ICD-10-CM

## 2018-05-24 DIAGNOSIS — M54.12 CERVICAL RADICULOPATHY: ICD-10-CM

## 2018-05-24 DIAGNOSIS — N62 HYPERTROPHY OF BREAST: ICD-10-CM

## 2018-05-24 DIAGNOSIS — M79.602 PAIN IN BOTH UPPER EXTREMITIES: ICD-10-CM

## 2018-05-24 DIAGNOSIS — F40.243 FLYING PHOBIA: ICD-10-CM

## 2018-05-24 LAB — BETA HCG QUAL IFA URINE: NEGATIVE

## 2018-05-24 PROCEDURE — 99214 OFFICE O/P EST MOD 30 MIN: CPT | Performed by: NURSE PRACTITIONER

## 2018-05-24 PROCEDURE — 84703 CHORIONIC GONADOTROPIN ASSAY: CPT | Performed by: NURSE PRACTITIONER

## 2018-05-24 RX ORDER — ALPRAZOLAM 0.25 MG
.25-.5 TABLET ORAL EVERY 6 HOURS PRN
Qty: 10 TABLET | Refills: 0 | Status: SHIPPED | OUTPATIENT
Start: 2018-05-24 | End: 2018-12-12

## 2018-05-24 NOTE — MR AVS SNAPSHOT
After Visit Summary   5/24/2018    Reyna Zhang    MRN: [id number]           Patient Information     Date Of Birth          1980        Visit Information        Provider Department      5/24/2018 10:00 AM Juana Ro APRN [facility]        Today's Diagnoses     Preop general physical exam    -  1    Hypertrophy of breast        Cervical radiculopathy        Pain in both upper extremities        Flying phobia          Care Instructions      Before Your Surgery      Call your surgeon if there is any change in your health. This includes signs of a cold or flu (such as a sore throat, runny nose, cough, rash or fever).    Do not smoke, drink alcohol or take over the counter medicine (unless your surgeon or primary care doctor tells you to) for the 24 hours before and after surgery.    If you take prescribed drugs: Follow your doctor s orders about which medicines to take and which to stop until after surgery.    Eating and drinking prior to surgery: follow the instructions from your surgeon    Take a shower or bath the night before surgery. Use the soap your surgeon gave you to gently clean your skin. If you do not have soap from your surgeon, use your regular soap. Do not shave or scrub the surgery site.  Wear clean pajamas and have clean sheets on your bed.           Follow-ups after your visit        Your next 10 appointments already scheduled     May 29, 2018 10:30 AM CDT   (Arrive by 10:15 AM)   Return Plastic Surgery with Hannah Ramires MD   [facility] Plastic and Reconstructive Surgery ([facility])    [address] [id number] [phone]           Do not wear perfume.            Jun 04, 2018   Procedure with Hannah Ramires MD   [facility] [place], Same Day Surgery (--)    [address] [facility] [address]   [phone]            Jun 19, 2018 10:00 AM CDT   (Arrive by 9:45 AM)   Post-Op  with Hannah Ramires MD   [facility] Plastic and Reconstructive Surgery ([facility])    [address] [facility]  [address] [facility] [address] [phone]              Who to contact     If you have questions or need follow up information about today's clinic visit or your schedule please contact [facility] directly at [phone].  Normal or non-critical lab and imaging results will be communicated to you by MyChart, letter or phone within 4 business days after the clinic has received the results. If you do not hear from us within 7 days, please contact the clinic through [facility] [facility] or phone. If you have a critical or abnormal lab result, we will notify you by phone as soon as possible.  Submit refill requests through [employer] or call your pharmacy and they will forward the refill request to us. Please allow 3 business days for your refill to be completed.          Additional Information About Your Visit        [facility] [facility] Information     [employer] gives you secure access to your electronic health record. If you see a primary care provider, you can also send messages to your care team and make appointments. If you have questions, please call your primary care clinic.  If you do not have a primary care provider, please call [phone] and they will assist you.        Care EveryWhere ID     This is your Care EveryWhere ID. This could be used by other organizations to access your [place] medical records  [id number]        Your Vitals Were     Pulse Temperature Last Period Pulse Oximetry BMI (Body Mass Index)       110 98.6  F (37  C) (Oral) 05/24/2018 100% 29.5 kg/m2        Blood Pressure from Last 3 Encounters:   05/24/18 100/70   02/06/18 132/86   11/29/17 104/72    Weight from Last 3 Encounters:   05/24/18 161 lb 4.8 oz (73.2 kg)   02/06/18 156 lb 3.2 oz (70.9 kg)   11/29/17 150 lb (68 kg)              We Performed the Following     Beta HCG Qual, Urine - [facility] and  [place] ([id number])          Where to get your medicines      Some of these will need a paper prescription and others can be bought over the counter.  Ask your nurse if you have questions.     Bring a paper prescription for each of these medications     ALPRAZolam 0.25 MG tablet          Primary Care Provider Office Phone # Fax #    DARIUS Diamond [facility] [phone]       [address] [facility] [address]  [facility] [address]        Equal Access to Services     QUYNH CHOI : Hadii aad ku hadasho Soomaali, waaxda luqadaha, qaybta kaalmada adeegyada, waxay idiin hayaan adeeg kharash la'aan . So [facility] [phone].    ATENCIÓN: Si habla español, tiene a martinez disposición servicios gratuitos de asistencia lingüística. [facility] [phone].    We comply with applicable federal civil rights laws and [place] laws. We do not discriminate on the basis of race, color, national origin, age, disability, sex, sexual orientation, or gender identity.            Thank you!     Thank you for choosing [facility]  for your care. Our goal is always to provide you with excellent care. Hearing back from our patients is one way we can continue to improve our services. Please take a few minutes to complete the written survey that you may receive in the mail after your visit with us. Thank you!             Your Updated Medication List - Protect others around you: Learn how to safely use, store and throw away your medicines at www.disposemymeds.org.          This list is accurate as of 5/24/18 10:20 AM.  Always use your most recent med list.                   Brand Name Dispense Instructions for use Diagnosis    ALPRAZolam 0.25 MG tablet    XANAX    10 tablet    Take 1-2 tablets (0.25-0.5 mg) by mouth every 6 hours as needed for anxiety    Flying phobia       amitriptyline 25 MG tablet    ELAVIL    90 tablet    Take 1-2 tablets (25-50 mg) by mouth At Bedtime    Cervicalgia

## 2018-05-24 NOTE — PROGRESS NOTES
[address] [address]  [phone]  Dept: [phone]    PRE-OP EVALUATION:  Today's date: 2018    Reyna Zhang (: 1980) presents for pre-operative evaluation assessment as requested by Dr. Ramires.  She requires evaluation and anesthesia risk assessment prior to undergoing surgery/procedure for treatment of breast reduction .    Fax number for surgical facility:   Primary Physician: Juana Ro  Type of Anesthesia Anticipated: General    Patient has a Health Care Directive or Living Will:  NO    Preop Questions 2018   Who is doing your surgery? Hannah Ramires MD   What are you having done? bilateral breast reduction   Date of Surgery/Procedure: 2018   Facility or Hospital where procedure/surgery will be performed: [facility]   1.  Do you have a history of Heart attack, stroke, stent, coronary bypass surgery, or other heart surgery? No   2.  Do you ever have any pain or discomfort in your chest? No   3.  Do you have a history of  Heart Failure? No   4.   Are you troubled by shortness of breath when:  walking on a level surface, or up a slight hill, or at night? No   5.  Do you currently have a cold, bronchitis or other respiratory infection? No   6.  Do you have a cough, shortness of breath, or wheezing? YES   7.  Do you sometimes get pains in the calves of your legs when you walk? No   8. Do you or anyone in your family have previous history of blood clots? No   9.  Do you or does anyone in your family have a serious bleeding problem such as prolonged bleeding following surgeries or cuts? No   10. Have you ever had problems with anemia or been told to take iron pills? No   11. Have you had any abnormal blood loss such as black, tarry or bloody stools, or abnormal vaginal bleeding? No   12. Have you ever had a blood transfusion? No   13. Have you or any of your relatives ever had problems with  anesthesia? No   14. Do you have sleep apnea, excessive snoring or daytime drowsiness? No   15. Do you have any prosthetic heart valves? No   16. Do you have prosthetic joints? No   17. Is there any chance that you may be pregnant? UNKNOWN- always has protected sex but says she thought this might be required before surgery         HPI:     HPI related to upcoming procedure: Bilateral upper extremity pain and cervicalgia thought to be related to breast hypertrophy.      See problem list for active medical problems.  Problems all longstanding and stable, except as noted/documented.  See ROS for pertinent symptoms related to these conditions.                                                                                                                                                          .    MEDICAL HISTORY:     Patient Active Problem List    Diagnosis Date Noted     Neck pain 03/17/2016     Priority: Medium     Cervical radiculopathy 03/17/2016     Priority: Medium     Pain of left upper extremity 03/17/2016     Priority: Medium     Pain of right upper extremity 03/17/2016     Priority: Medium     Plantar warts 02/17/2016     Priority: Medium     Carpal tunnel syndrome of left wrist 02/17/2016     Priority: Medium      No past medical history on file.  No past surgical history on file.  Current Outpatient Prescriptions   Medication Sig Dispense Refill     ALPRAZolam (XANAX) 0.25 MG tablet Take 1-2 tablets (0.25-0.5 mg) by mouth every 6 hours as needed for anxiety 10 tablet 0     amitriptyline (ELAVIL) 25 MG tablet Take 1-2 tablets (25-50 mg) by mouth At Bedtime 90 tablet 1     OTC products: none    Allergies   Allergen Reactions     Macrobid [Nitrofurantoin]      Sulfa Drugs      Amoxicillin Rash      Latex Allergy: NO, but states she gets a little irritated from it    Social History   Substance Use Topics     Smoking status: Never Smoker     Smokeless tobacco: Never Used     Alcohol use Yes      Comment:  0-1/week     History   Drug Use No       REVIEW OF SYSTEMS:   CONSTITUTIONAL: NEGATIVE for fever, chills, change in weight  INTEGUMENTARY/SKIN: NEGATIVE for worrisome rashes, moles or lesions  EYES: NEGATIVE for vision changes or irritation  ENT/MOUTH: NEGATIVE for ear, mouth and throat problems  RESP: NEGATIVE for significant cough or SOB  BREAST: NEGATIVE for masses, tenderness or discharge  CV: NEGATIVE for chest pain, palpitations or peripheral edema  GI: NEGATIVE for nausea, abdominal pain, heartburn, or change in bowel habits  : NEGATIVE for frequency, dysuria, or hematuria  MUSCULOSKELETAL: NEGATIVE for significant arthralgias or myalgia  NEURO: NEGATIVE for weakness, dizziness or paresthesias  ENDOCRINE: NEGATIVE for temperature intolerance, skin/hair changes  HEME: NEGATIVE for bleeding problems  PSYCHIATRIC: NEGATIVE for changes in mood or affect    EXAM:   /70  Pulse 110  Temp 98.6  F (37  C) (Oral)  Wt 161 lb 4.8 oz (73.2 kg)  LMP 05/24/2018  SpO2 100%  BMI 29.5 kg/m2    GENERAL APPEARANCE: healthy, alert and no distress     EYES: EOMI, PERRL     HENT: ear canals and TM's normal and nose and mouth without ulcers or lesions     NECK: no adenopathy, no asymmetry, masses, or scars and thyroid normal to palpation     RESP: lungs clear to auscultation - no rales, rhonchi or wheezes     CV: regular rates and rhythm, normal S1 S2, no S3 or S4 and no murmur, click or rub     ABDOMEN:  soft, nontender, no HSM or masses and bowel sounds normal     MS: extremities normal- no gross deformities noted, no evidence of inflammation in joints, FROM in all extremities.     SKIN: no suspicious lesions or rashes     NEURO: Normal strength and tone, sensory exam grossly normal, mentation intact and speech normal     PSYCH: mentation appears normal. and affect normal/bright     LYMPHATICS: No cervical adenopathy    DIAGNOSTICS:   No labs or EKG required for low risk surgery (cataract, skin procedure, breast  biopsy, etc)    No results for input(s): HGB, PLT, INR, NA, POTASSIUM, CR, A1C in the last 78395 hours.     IMPRESSION:   Reason for surgery/procedure:Breast Reduction  Diagnosis/reason for consult: Hypertrophy of Breast  The proposed surgical procedure is considered INTERMEDIATE risk.    REVISED CARDIAC RISK INDEX  The patient has the following serious cardiovascular risks for perioperative complications such as (MI, PE, VFib and 3  AV Block):  No serious cardiac risks  INTERPRETATION: 0 risks: Class I (very low risk - 0.4% complication rate)    The patient has the following additional risks for perioperative complications:  No identified additional risks      ICD-10-CM    1. Preop general physical exam Z01.818 Beta HCG Qual, Urine - [facility] and [place] ([id number])   2. Hypertrophy of breast N62    3. Cervical radiculopathy M54.12    4. Pain in both upper extremities M79.601     M79.602        RECOMMENDATIONS:         --Patient is to take all scheduled medications on the day of surgery EXCEPT for modifications listed below.    APPROVAL GIVEN to proceed with proposed procedure, without further diagnostic evaluation       Signed Electronically by: DARIUS Diamond CNP    Copy of this evaluation report is provided to requesting physician.    [facility] Preop Guidelines    Revised Cardiac Risk Index

## 2018-05-29 ENCOUNTER — OFFICE VISIT (OUTPATIENT)
Dept: PLASTIC SURGERY | Facility: CLINIC | Age: 38
End: 2018-05-29
Payer: COMMERCIAL

## 2018-05-29 DIAGNOSIS — N62 SYMPTOMATIC MAMMARY HYPERTROPHY: Primary | ICD-10-CM

## 2018-05-29 NOTE — MR AVS SNAPSHOT
After Visit Summary   5/29/2018    Reyna Zhang    MRN: [id number]           Patient Information     Date Of Birth          1980        Visit Information        Provider Department      5/29/2018 10:30 AM Hannah Ramires MD [facility] Plastic and Reconstructive Surgery        Today's Diagnoses     Symptomatic mammary hypertrophy    -  1       Follow-ups after your visit        Your next 10 appointments already scheduled     Jun 19, 2018 10:00 AM CDT   (Arrive by 9:45 AM)   Post-Op with MD WANDA Chen [facility] Plastic and Reconstructive Surgery ([facility] [place])    [address] [place] [id number] [phone]              Who to contact     Please call your clinic at [phone] to:    Ask questions about your health    Make or cancel appointments    Discuss your medicines    Learn about your test results    Speak to your doctor            Additional Information About Your Visit        MyChart Information     [employer] gives you secure access to your electronic health record. If you see a primary care provider, you can also send messages to your care team and make appointments. If you have questions, please call your primary care clinic.  If you do not have a primary care provider, please call [phone] and they will assist you.      [employer] is an electronic gateway that provides easy, online access to your medical records. With [employer], you can request a clinic appointment, read your test results, renew a prescription or communicate with your care team.     To access your existing account, please contact your [facility] or call [phone] for assistance.        Care EveryWhere ID     This is your Care EveryWhere ID. This could be used by other organizations to access your [facility] medical records  [id number]        Your Vitals Were     Last Period                   05/24/2018            Blood  Pressure from Last 3 Encounters:   06/04/18 100/65   05/24/18 100/70   02/06/18 132/86    Weight from Last 3 Encounters:   06/04/18 164 lb 0.4 oz   05/24/18 161 lb 4.8 oz   02/06/18 156 lb 3.2 oz               Primary Care Provider Office Phone # Fax #    DARIUS [facility] [phone]       [address]  [facility] [address]        Equal Access to Services     QUYNH CHOI : Hadii aad ku hadasho Soomaali, waaxda luqadaha, qaybta kaalmada adeegyada, waxay idiin hayaan adegertrude ramey . [facility] [phone].    ATENCIÓN: Si habla español, tiene a martinez disposición servicios gratuitos de asistencia lingüística. [facility] [phone].    We comply with applicable federal civil rights laws and [place] laws. We do not discriminate on the basis of race, color, national origin, age, disability, sex, sexual orientation, or gender identity.            Thank you!     Thank you for choosing [facility] PLASTIC AND RECONSTRUCTIVE SURGERY  for your care. Our goal is always to provide you with excellent care. Hearing back from our patients is one way we can continue to improve our services. Please take a few minutes to complete the written survey that you may receive in the mail after your visit with us. Thank you!             Your Updated Medication List - Protect others around you: Learn how to safely use, store and throw away your medicines at www.disposemymeds.org.          This list is accurate as of 5/29/18 11:59 PM.  Always use your most recent med list.                   Brand Name Dispense Instructions for use Diagnosis    ALPRAZolam 0.25 MG tablet    XANAX    10 tablet    Take 1-2 tablets (0.25-0.5 mg) by mouth every 6 hours as needed for anxiety    Flying phobia       amitriptyline 25 MG tablet    ELAVIL    90 tablet    Take 1-2 tablets (25-50 mg) by mouth At Bedtime    Cervicalgia       hydrOXYzine 25 MG tablet    ATARAX    20 tablet    Take 1 tablet (25 mg) by mouth 3 times daily  as needed for itching    S/P bilateral breast reduction       ondansetron 4 MG ODT tab    ZOFRAN ODT    20 tablet    Take 1 tablet (4 mg) by mouth every 8 hours as needed for nausea    S/P bilateral breast reduction

## 2018-05-29 NOTE — PROGRESS NOTES
"PLASTICS BREAST REDUCTION PRE-OP  HPI: The patient is a 37 year old female with a history of symptomatic breast hypertrophy and neck pain who presents today with her partner, Derek, for her pre-op visit before a bilateral breast reduction. She had her H&P on , and has not yet had a mammogram. She will need a mammogram before surgery.  We discussed whether drains were necessary and indications for drains. 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 She says this is also a feeling of lack of control about the surgery, as well as some feelings from high school, when a friend  during a tonsillectomy. She is thinking she wants to be a \"larger B\" cup size. She brought in pictures of breasts which look how she wants to look. We reviewed that she should not take NSAIDs for a week prior to surgery, and her other medications can be taken.     We discussed the possible risks and complications, as well as perioperative cares and limitations for his procedure.  Periop instructions included: not eat anything 8 hours prior to surgery and can drink clear liquids up to 4 hours before surgery, except for am meds with sip of water and preop shower with surgical soap. The events of the day of surgery were explained - including preop, intraop and postop phases of care. The patient wanted specific details about the surgical technique.  We also went over the possible risks and complications involved with this elective procedure. These include but are not limited to: infection, bleeding, hematoma/seroma formation, poor healing - including dehiscence, nipple graft loss, hypertrophic scarring. Altered chest sensation- either hypo or hypersensitive, residual deformities and asymmetries, possible further surgical revisions, possible injury to surrounding neurovascular and musculoskeletal structures, including intra-axillary or intra-thoracic, " anesthetic risks such as DVT/PE or cardiopulmonary events.  Postop cares and limitations were discussed with relation to his home and work settings.      We reviewed what will happen on the day of the surgery, including the sequence of events and people who will assist, and that on the day of surgery he should wear a button-up shirt. We discussed oxycodone, Z-pac, antiemetics and antipruritics which will be prescribed. We discussed that he will not be able to lift anything more than 5 lbs or reach above his head for at least 3 weeks after the surgery. We discussed the recovery process. All questions were answered.     Total time = 30 minutes, over half of which spent discussing surgical options     This note was prepared on behalf of Dr. Ramires by Joslyn Stiles, a trained medical scribe, based on the provider's statements to me.

## 2018-05-29 NOTE — LETTER
"2018       RE: Reyna Zhang  [address] [address]     Dear Colleague,    Thank you for referring your patient, Reyna Zhang, to the [facility] PLASTIC AND RECONSTRUCTIVE SURGERY at [facility]. Please see a copy of my visit note below.    PLASTICS BREAST REDUCTION PRE-OP  HPI: The patient is a 37 year old female with a history of symptomatic breast hypertrophy and neck pain who presents today with her partner, Derek, for her pre-op visit before a bilateral breast reduction. She had her H&P on , and has not yet had a mammogram. She will need a mammogram before surgery.  We discussed whether drains were necessary and indications for drains. 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 She says this is also a feeling of lack of control about the surgery, as well as some feelings from high school, when a friend  during a tonsillectomy. She is thinking she wants to be a \"larger B\" cup size. She brought in pictures of breasts which look how she wants to look. We reviewed that she should not take NSAIDs for a week prior to surgery, and her other medications can be taken.     We discussed the possible risks and complications, as well as perioperative cares and limitations for his procedure.  Periop instructions included: not eat anything 8 hours prior to surgery and can drink clear liquids up to 4 hours before surgery, except for am meds with sip of water and preop shower with surgical soap. The events of the day of surgery were explained - including preop, intraop and postop phases of care. The patient wanted specific details about the surgical technique.  We also went over the possible risks and complications involved with this elective procedure. These include but are not limited to: infection, bleeding, hematoma/seroma formation, poor healing - including " dehiscence, nipple graft loss, hypertrophic scarring. Altered chest sensation- either hypo or hypersensitive, residual deformities and asymmetries, possible further surgical revisions, possible injury to surrounding neurovascular and musculoskeletal structures, including intra-axillary or intra-thoracic, anesthetic risks such as DVT/PE or cardiopulmonary events.  Postop cares and limitations were discussed with relation to his home and work settings.      We reviewed what will happen on the day of the surgery, including the sequence of events and people who will assist, and that on the day of surgery he should wear a button-up shirt. We discussed oxycodone, Z-pac, antiemetics and antipruritics which will be prescribed. We discussed that he will not be able to lift anything more than 5 lbs or reach above his head for at least 3 weeks after the surgery. We discussed the recovery process. All questions were answered.     Total time = 30 minutes, over half of which spent discussing surgical options     This note was prepared on behalf of Dr. Ramires by Joslyn Stiles, a trained medical scribe, based on the provider's statements to me.          Again, thank you for allowing me to participate in the care of your patient.      Sincerely,    Hannah Ramires MD

## 2018-05-31 ENCOUNTER — ANESTHESIA EVENT (OUTPATIENT)
Dept: SURGERY | Facility: CLINIC | Age: 38
End: 2018-05-31
Payer: COMMERCIAL

## 2018-05-31 ENCOUNTER — RADIANT APPOINTMENT (OUTPATIENT)
Dept: MAMMOGRAPHY | Facility: CLINIC | Age: 38
End: 2018-05-31
Payer: COMMERCIAL

## 2018-05-31 DIAGNOSIS — N62 SYMPTOMATIC MAMMARY HYPERTROPHY: ICD-10-CM

## 2018-06-04 ENCOUNTER — ANESTHESIA (OUTPATIENT)
Dept: SURGERY | Facility: CLINIC | Age: 38
End: 2018-06-04
Payer: COMMERCIAL

## 2018-06-04 ENCOUNTER — HOSPITAL ENCOUNTER (OUTPATIENT)
Facility: CLINIC | Age: 38
Discharge: HOME | End: 2018-06-04
Attending: SURGERY | Admitting: SURGERY
Payer: COMMERCIAL

## 2018-06-04 VITALS
OXYGEN SATURATION: 95 % | HEART RATE: 103 BPM | RESPIRATION RATE: 16 BRPM | WEIGHT: 164.02 LBS | HEIGHT: 63 IN | BODY MASS INDEX: 29.06 KG/M2 | SYSTOLIC BLOOD PRESSURE: 100 MMHG | TEMPERATURE: 98.5 F | DIASTOLIC BLOOD PRESSURE: 65 MMHG

## 2018-06-04 DIAGNOSIS — Z98.890 S/P BILATERAL BREAST REDUCTION: Primary | ICD-10-CM

## 2018-06-04 LAB
ABO + RH BLD: NORMAL
ABO + RH BLD: NORMAL
BLD GP AB SCN SERPL QL: NORMAL
BLOOD BANK CMNT PATIENT-IMP: NORMAL
GLUCOSE SERPL-MCNC: 76 MG/DL (ref 70–99)
HCG UR QL: NEGATIVE
HGB BLD-MCNC: 13.5 G/DL (ref 11.7–15.7)
SPECIMEN EXP DATE BLD: NORMAL

## 2018-06-04 PROCEDURE — 25000128 H RX IP 250 OP 636: Performed by: ANESTHESIOLOGY

## 2018-06-04 PROCEDURE — 86901 BLOOD TYPING SEROLOGIC RH(D): CPT | Performed by: ANESTHESIOLOGY

## 2018-06-04 PROCEDURE — 40000170 ZZH STATISTIC PRE-PROCEDURE ASSESSMENT II: Performed by: SURGERY

## 2018-06-04 PROCEDURE — 25000125 ZZHC RX 250: Performed by: SURGERY

## 2018-06-04 PROCEDURE — 36000059 ZZH SURGERY LEVEL 3 EA 15 ADDTL MIN UMMC: Performed by: SURGERY

## 2018-06-04 PROCEDURE — 25000125 ZZHC RX 250: Performed by: NURSE ANESTHETIST, CERTIFIED REGISTERED

## 2018-06-04 PROCEDURE — 27210794 ZZH OR GENERAL SUPPLY STERILE: Performed by: SURGERY

## 2018-06-04 PROCEDURE — 86850 RBC ANTIBODY SCREEN: CPT | Performed by: ANESTHESIOLOGY

## 2018-06-04 PROCEDURE — 86900 BLOOD TYPING SEROLOGIC ABO: CPT | Performed by: ANESTHESIOLOGY

## 2018-06-04 PROCEDURE — 81025 URINE PREGNANCY TEST: CPT | Performed by: ANESTHESIOLOGY

## 2018-06-04 PROCEDURE — C9399 UNCLASSIFIED DRUGS OR BIOLOG: HCPCS | Performed by: NURSE ANESTHETIST, CERTIFIED REGISTERED

## 2018-06-04 PROCEDURE — 88305 TISSUE EXAM BY PATHOLOGIST: CPT | Performed by: SURGERY

## 2018-06-04 PROCEDURE — 37000008 ZZH ANESTHESIA TECHNICAL FEE, 1ST 30 MIN: Performed by: SURGERY

## 2018-06-04 PROCEDURE — 25000128 H RX IP 250 OP 636: Performed by: NURSE ANESTHETIST, CERTIFIED REGISTERED

## 2018-06-04 PROCEDURE — 36000057 ZZH SURGERY LEVEL 3 1ST 30 MIN - UMMC: Performed by: SURGERY

## 2018-06-04 PROCEDURE — 27210995 ZZH RX 272: Performed by: SURGERY

## 2018-06-04 PROCEDURE — 88305 TISSUE EXAM BY PATHOLOGIST: CPT | Mod: 26 | Performed by: SURGERY

## 2018-06-04 PROCEDURE — 71000027 ZZH RECOVERY PHASE 2 EACH 15 MINS: Performed by: SURGERY

## 2018-06-04 PROCEDURE — 25000566 ZZH SEVOFLURANE, EA 15 MIN: Performed by: SURGERY

## 2018-06-04 PROCEDURE — 85018 HEMOGLOBIN: CPT | Performed by: ANESTHESIOLOGY

## 2018-06-04 PROCEDURE — 37000009 ZZH ANESTHESIA TECHNICAL FEE, EACH ADDTL 15 MIN: Performed by: SURGERY

## 2018-06-04 PROCEDURE — 25000128 H RX IP 250 OP 636: Performed by: SURGERY

## 2018-06-04 PROCEDURE — 71000014 ZZH RECOVERY PHASE 1 LEVEL 2 FIRST HR: Performed by: SURGERY

## 2018-06-04 PROCEDURE — 82947 ASSAY GLUCOSE BLOOD QUANT: CPT | Performed by: ANESTHESIOLOGY

## 2018-06-04 PROCEDURE — 25000125 ZZHC RX 250: Performed by: ANESTHESIOLOGY

## 2018-06-04 PROCEDURE — 25000132 ZZH RX MED GY IP 250 OP 250 PS 637: Performed by: SURGERY

## 2018-06-04 RX ORDER — ONDANSETRON 4 MG/1
4 TABLET, ORALLY DISINTEGRATING ORAL EVERY 30 MIN PRN
Status: DISCONTINUED | OUTPATIENT
Start: 2018-06-04 | End: 2018-06-04 | Stop reason: HOSPADM

## 2018-06-04 RX ORDER — ONDANSETRON 4 MG/1
4 TABLET, ORALLY DISINTEGRATING ORAL EVERY 8 HOURS PRN
Qty: 20 TABLET | Refills: 1 | Status: SHIPPED | OUTPATIENT
Start: 2018-06-04 | End: 2018-06-19

## 2018-06-04 RX ORDER — OXYCODONE HYDROCHLORIDE 5 MG/1
5-10 TABLET ORAL EVERY 6 HOURS PRN
Qty: 50 TABLET | Refills: 0 | Status: SHIPPED | OUTPATIENT
Start: 2018-06-04 | End: 2018-06-15

## 2018-06-04 RX ORDER — HYDROMORPHONE HYDROCHLORIDE 1 MG/ML
.3-.5 INJECTION, SOLUTION INTRAMUSCULAR; INTRAVENOUS; SUBCUTANEOUS EVERY 5 MIN PRN
Status: DISCONTINUED | OUTPATIENT
Start: 2018-06-04 | End: 2018-06-04 | Stop reason: HOSPADM

## 2018-06-04 RX ORDER — CLINDAMYCIN PHOSPHATE 900 MG/50ML
900 INJECTION, SOLUTION INTRAVENOUS
Status: COMPLETED | OUTPATIENT
Start: 2018-06-04 | End: 2018-06-04

## 2018-06-04 RX ORDER — MEPERIDINE HYDROCHLORIDE 25 MG/ML
12.5 INJECTION INTRAMUSCULAR; INTRAVENOUS; SUBCUTANEOUS EVERY 5 MIN PRN
Status: DISCONTINUED | OUTPATIENT
Start: 2018-06-04 | End: 2018-06-04 | Stop reason: HOSPADM

## 2018-06-04 RX ORDER — PROPOFOL 10 MG/ML
INJECTION, EMULSION INTRAVENOUS PRN
Status: DISCONTINUED | OUTPATIENT
Start: 2018-06-04 | End: 2018-06-04

## 2018-06-04 RX ORDER — LIDOCAINE HYDROCHLORIDE 20 MG/ML
INJECTION, SOLUTION INFILTRATION; PERINEURAL PRN
Status: DISCONTINUED | OUTPATIENT
Start: 2018-06-04 | End: 2018-06-04

## 2018-06-04 RX ORDER — PROPOFOL 10 MG/ML
INJECTION, EMULSION INTRAVENOUS CONTINUOUS PRN
Status: DISCONTINUED | OUTPATIENT
Start: 2018-06-04 | End: 2018-06-04

## 2018-06-04 RX ORDER — DEXAMETHASONE SODIUM PHOSPHATE 4 MG/ML
INJECTION, SOLUTION INTRA-ARTICULAR; INTRALESIONAL; INTRAMUSCULAR; INTRAVENOUS; SOFT TISSUE PRN
Status: DISCONTINUED | OUTPATIENT
Start: 2018-06-04 | End: 2018-06-04

## 2018-06-04 RX ORDER — HYDROXYZINE HYDROCHLORIDE 25 MG/1
25 TABLET, FILM COATED ORAL 3 TIMES DAILY PRN
Qty: 20 TABLET | Refills: 1 | Status: SHIPPED | OUTPATIENT
Start: 2018-06-04 | End: 2018-06-19

## 2018-06-04 RX ORDER — LIDOCAINE HYDROCHLORIDE AND EPINEPHRINE 10; 10 MG/ML; UG/ML
INJECTION, SOLUTION INFILTRATION; PERINEURAL PRN
Status: DISCONTINUED | OUTPATIENT
Start: 2018-06-04 | End: 2018-06-04 | Stop reason: HOSPADM

## 2018-06-04 RX ORDER — SODIUM CHLORIDE, SODIUM LACTATE, POTASSIUM CHLORIDE, CALCIUM CHLORIDE 600; 310; 30; 20 MG/100ML; MG/100ML; MG/100ML; MG/100ML
INJECTION, SOLUTION INTRAVENOUS CONTINUOUS
Status: DISCONTINUED | OUTPATIENT
Start: 2018-06-04 | End: 2018-06-04 | Stop reason: HOSPADM

## 2018-06-04 RX ORDER — SCOLOPAMINE TRANSDERMAL SYSTEM 1 MG/1
1 PATCH, EXTENDED RELEASE TRANSDERMAL
Status: DISCONTINUED | OUTPATIENT
Start: 2018-06-04 | End: 2018-06-04 | Stop reason: HOSPADM

## 2018-06-04 RX ORDER — ONDANSETRON 2 MG/ML
INJECTION INTRAMUSCULAR; INTRAVENOUS PRN
Status: DISCONTINUED | OUTPATIENT
Start: 2018-06-04 | End: 2018-06-04

## 2018-06-04 RX ORDER — SODIUM CHLORIDE, SODIUM LACTATE, POTASSIUM CHLORIDE, CALCIUM CHLORIDE 600; 310; 30; 20 MG/100ML; MG/100ML; MG/100ML; MG/100ML
INJECTION, SOLUTION INTRAVENOUS CONTINUOUS PRN
Status: DISCONTINUED | OUTPATIENT
Start: 2018-06-04 | End: 2018-06-04

## 2018-06-04 RX ORDER — FENTANYL CITRATE 50 UG/ML
25-50 INJECTION, SOLUTION INTRAMUSCULAR; INTRAVENOUS
Status: DISCONTINUED | OUTPATIENT
Start: 2018-06-04 | End: 2018-06-04 | Stop reason: HOSPADM

## 2018-06-04 RX ORDER — FENTANYL CITRATE 50 UG/ML
INJECTION, SOLUTION INTRAMUSCULAR; INTRAVENOUS PRN
Status: DISCONTINUED | OUTPATIENT
Start: 2018-06-04 | End: 2018-06-04

## 2018-06-04 RX ORDER — CLINDAMYCIN PHOSPHATE 900 MG/50ML
900 INJECTION, SOLUTION INTRAVENOUS SEE ADMIN INSTRUCTIONS
Status: DISCONTINUED | OUTPATIENT
Start: 2018-06-04 | End: 2018-06-04 | Stop reason: HOSPADM

## 2018-06-04 RX ORDER — NALOXONE HYDROCHLORIDE 0.4 MG/ML
.1-.4 INJECTION, SOLUTION INTRAMUSCULAR; INTRAVENOUS; SUBCUTANEOUS
Status: DISCONTINUED | OUTPATIENT
Start: 2018-06-04 | End: 2018-06-04 | Stop reason: HOSPADM

## 2018-06-04 RX ORDER — OXYCODONE HYDROCHLORIDE 5 MG/1
5 TABLET ORAL EVERY 4 HOURS PRN
Status: DISCONTINUED | OUTPATIENT
Start: 2018-06-04 | End: 2018-06-04 | Stop reason: HOSPADM

## 2018-06-04 RX ORDER — ONDANSETRON 2 MG/ML
4 INJECTION INTRAMUSCULAR; INTRAVENOUS EVERY 30 MIN PRN
Status: DISCONTINUED | OUTPATIENT
Start: 2018-06-04 | End: 2018-06-04 | Stop reason: HOSPADM

## 2018-06-04 RX ADMIN — CLINDAMYCIN PHOSPHATE 900 MG: 18 INJECTION, SOLUTION INTRAVENOUS at 08:56

## 2018-06-04 RX ADMIN — ROCURONIUM BROMIDE 50 MG: 10 INJECTION INTRAVENOUS at 08:44

## 2018-06-04 RX ADMIN — ONDANSETRON 4 MG: 2 INJECTION INTRAMUSCULAR; INTRAVENOUS at 15:15

## 2018-06-04 RX ADMIN — SUGAMMADEX 150 MG: 100 INJECTION, SOLUTION INTRAVENOUS at 16:25

## 2018-06-04 RX ADMIN — CLINDAMYCIN PHOSPHATE 900 MG: 18 INJECTION, SOLUTION INTRAVENOUS at 14:49

## 2018-06-04 RX ADMIN — HYDROMORPHONE HYDROCHLORIDE 0.3 MG: 1 INJECTION, SOLUTION INTRAMUSCULAR; INTRAVENOUS; SUBCUTANEOUS at 17:13

## 2018-06-04 RX ADMIN — LIDOCAINE HYDROCHLORIDE 100 MG: 20 INJECTION, SOLUTION INFILTRATION; PERINEURAL at 08:44

## 2018-06-04 RX ADMIN — SCOPALAMINE 1 PATCH: 1 PATCH, EXTENDED RELEASE TRANSDERMAL at 07:33

## 2018-06-04 RX ADMIN — HYDROMORPHONE HYDROCHLORIDE 0.2 MG: 1 INJECTION, SOLUTION INTRAMUSCULAR; INTRAVENOUS; SUBCUTANEOUS at 16:06

## 2018-06-04 RX ADMIN — HYDROMORPHONE HYDROCHLORIDE 0.3 MG: 1 INJECTION, SOLUTION INTRAMUSCULAR; INTRAVENOUS; SUBCUTANEOUS at 10:52

## 2018-06-04 RX ADMIN — HYDROMORPHONE HYDROCHLORIDE 0.2 MG: 1 INJECTION, SOLUTION INTRAMUSCULAR; INTRAVENOUS; SUBCUTANEOUS at 11:38

## 2018-06-04 RX ADMIN — MIDAZOLAM 1 MG: 1 INJECTION INTRAMUSCULAR; INTRAVENOUS at 08:40

## 2018-06-04 RX ADMIN — FENTANYL CITRATE 100 MCG: 50 INJECTION, SOLUTION INTRAMUSCULAR; INTRAVENOUS at 08:44

## 2018-06-04 RX ADMIN — SODIUM CHLORIDE, POTASSIUM CHLORIDE, SODIUM LACTATE AND CALCIUM CHLORIDE: 600; 310; 30; 20 INJECTION, SOLUTION INTRAVENOUS at 11:36

## 2018-06-04 RX ADMIN — HYDROMORPHONE HYDROCHLORIDE 0.3 MG: 1 INJECTION, SOLUTION INTRAMUSCULAR; INTRAVENOUS; SUBCUTANEOUS at 13:19

## 2018-06-04 RX ADMIN — FENTANYL CITRATE 50 MCG: 50 INJECTION, SOLUTION INTRAMUSCULAR; INTRAVENOUS at 09:17

## 2018-06-04 RX ADMIN — PROPOFOL 180 MG: 10 INJECTION, EMULSION INTRAVENOUS at 08:44

## 2018-06-04 RX ADMIN — SODIUM CHLORIDE, POTASSIUM CHLORIDE, SODIUM LACTATE AND CALCIUM CHLORIDE: 600; 310; 30; 20 INJECTION, SOLUTION INTRAVENOUS at 08:32

## 2018-06-04 RX ADMIN — HYDROMORPHONE HYDROCHLORIDE 0.2 MG: 1 INJECTION, SOLUTION INTRAMUSCULAR; INTRAVENOUS; SUBCUTANEOUS at 17:07

## 2018-06-04 RX ADMIN — DEXAMETHASONE SODIUM PHOSPHATE 8 MG: 4 INJECTION, SOLUTION INTRAMUSCULAR; INTRAVENOUS at 09:03

## 2018-06-04 RX ADMIN — PROPOFOL 25 MCG/KG/MIN: 10 INJECTION, EMULSION INTRAVENOUS at 09:25

## 2018-06-04 RX ADMIN — HYDROMORPHONE HYDROCHLORIDE 0.5 MG: 1 INJECTION, SOLUTION INTRAMUSCULAR; INTRAVENOUS; SUBCUTANEOUS at 17:27

## 2018-06-04 RX ADMIN — MIDAZOLAM 2 MG: 1 INJECTION INTRAMUSCULAR; INTRAVENOUS at 08:34

## 2018-06-04 RX ADMIN — SODIUM CHLORIDE, POTASSIUM CHLORIDE, SODIUM LACTATE AND CALCIUM CHLORIDE: 600; 310; 30; 20 INJECTION, SOLUTION INTRAVENOUS at 16:22

## 2018-06-04 RX ADMIN — OXYCODONE HYDROCHLORIDE 5 MG: 5 TABLET ORAL at 18:40

## 2018-06-04 RX ADMIN — FENTANYL CITRATE 25 MCG: 50 INJECTION INTRAMUSCULAR; INTRAVENOUS at 17:02

## 2018-06-04 NOTE — DISCHARGE INSTRUCTIONS
Caring for Your Incision    You ll need to care for your incision after surgery and certain medical procedures. To close an incision, your doctor used sutures (stitches), steri-strips, staples, or dermabond. Follow the tips on this sheet to help heal and prevent infection of your incision.   Types of Incision Closure:    Surgical Sutures (stitches) are placed by sewing the edges of an incision together with surgical thread. Sutures are either absorbable or non-absorbable. Absorbable sutures break down in the body over time. Non-absorbable sutures need to be removed.     Steri-strips are made of adhesive (sticky) material to help hold the edges of an incision together. Steri-strips usually fall off by themselves in 7 to 10 days.     Surgical Staples are made or steel or titanium. They are often used to close shallow incisions. They are not used on certain body areas, such as the face and hands. This is because these areas have nerves that are close to the surface. Staples are usually removed within a week.     Dermabond (skin glue) is used to close a cut or small incision. The skin glue is less painful than stitches (sutures). In some cases, a lower layer of skin may be sutured before Dermabond is applied. The skin glue closes the cut/incision within a few minutes. It also provides a water-resistant covering. No bandage is required. Dermabond peels off on its own within 5 to 10 days.  Home Care for Your  Incision:    Keep the incision clean and dry. You should bathe only as directed by the doctor. It is okay to wash around the incision, but don t spray water directly on it.     Check the incision site daily for pain, redness, drainage, swelling, or separation of the incision edges.     If you have a dressing over the incision, change the dressing as directed by the doctor.    Make sure any clothing that touches the incision is loose fitting. This will prevent rubbing. If the incision is on the head, avoid wearing  caps or other head coverings. These may rub against the incision.    Avoid rough play, contact sports, or physical activities. This can put you at risk of opening an incision.     As your incision heals, the skin may appear pink or red. It may also feel slightly bumpy or raised. This is called a healing ridge. Over time, the color should fade and the raised skin will become less noticeable.   Call the doctor right away if you have any of the following:    Increased pain, redness, drainage, swelling or bleeding at the incision site    Numbness, coldness or tingling around the incision site    Fever of 101 F degrees or higher  Rev. 4/2014    Same-Day Surgery   Adult Discharge Orders & Instructions     For 24 hours after surgery:  1. Get plenty of rest.  A responsible adult must stay with you for at least 24 hours after you leave the hospital.   2. Pain medication can slow your reflexes. Do not drive or use heavy equipment.  If you have weakness or tingling, don't drive or use heavy equipment until this feeling goes away.  3. Mixing alcohol and pain medication can cause dizziness and slow your breathing. It can even be fatal. Do not drink alcohol while taking pain medication.  4. Avoid strenuous or risky activities.  Ask for help when climbing stairs.   5. You may feel lightheaded.  If so, sit for a few minutes before standing.  Have someone help you get up.   6. If you have nausea (feel sick to your stomach), drink only clear liquids such as apple juice, ginger ale, broth or 7-Up.  Rest may also help.  Be sure to drink enough fluids.  Move to a regular diet as you feel able. Take pain medications with a small amount of solid food, such as toast or crackers, to avoid nausea.   7. A slight fever is normal. Call the doctor if your fever is over 100 F (37.7 C) (taken under the tongue) or lasts longer than 24 hours.  8. You may have a dry mouth, muscle aches, trouble sleeping or a sore throat.  These symptoms should go  away after 24 hours.  9. Do not make important or legal decisions.   Pain Management:      1. Take pain medication (if prescribed) for pain as directed by your physician.        2. WARNING: If the pain medication you have been prescribed contains Tylenol  (acetaminophen), DO NOT take additional doses of Tylenol (acetaminophen).     Call your doctor for any of the followin.  Signs of infection (fever, growing tenderness at the surgery site, severe pain, a large amount of drainage or bleeding, foul-smelling drainage, redness, swelling).    2.  It has been over 8 to 10 hours since surgery and you are still not able to urinate (pee).    3.  Headache for over 24 hours.    4.  Numbness, tingling or weakness the day after surgery (if you had spinal anesthesia).  To contact a doctor, call _____________________________________ or:      [phone] and ask for the Resident On Call for:          Plastic Surgery  (answered 24 hours a day)      Emergency Department:  [place] [facility]: [phone]  [place] [facility]: [phone]               Rev. 10/2014     Scopolamine Patch  (Absorbed through the skin)    The Scopolamine Patch prevents nausea and vomiting caused by motion sickness or anesthesia and surgery in adults.    Brand Name(s): Transderm Scop, Transderm-Scope  There may be other brand names for this medicine.    When This Medicine Should Not Be Used:  You should not use this medicine if you have had an allergic reaction to scopolamine, or if you have narrow angle glaucoma.    How to Use This Medicine:    Your doctor will tell you how many patches to use, where to apply them, and how often to apply them.     Do not use more patches or apply them more often than your doctor tells you to.    This medicine comes with patient instructions. Read and follow these instructions carefully. Ask your doctor or pharmacist if you have any questions.    To prevent motion sickness, apply the patch at  least 4 hours before you need it.    Wash and dry your hands thoroughly before applying the patch.    Leave the patch in its sealed wrapper until you are ready to put it on. Tear the wrapper open carefully. NEVER CUT the wrapper or the patch with scissors. Do not use any patch that has been cut by accident.    Take the liner off the sticky side before applying.    Apply the patch to dry, hairless skin behind the ear.    If the patch is loose or falls off, apply a new patch at a different place behind the ear.    After you take off the patch, wash the place where the patch was and your hands thoroughly.    Only one patch should be used at any time.    If a dose is missed:  If you forget to wear or change a patch, put one on as soon as you can. If it is almost time to put on your next patch, wait until then to apply a new patch and skip the one you missed. Do not apply extra patches to make up for a missed dose.    How to Store and Dispose of This Medicine:    Store the patches at room temperature in a closed container, away from heat, moisture and direct light.    Fold the used patch in half with the sticky sides together. Throw any used patch away so that children or pets cannot get to it. You will also need to throw away old patches after the expiration date has passed.    Keep all medicine away from children and never share your medicine with anyone.    Ask your doctor or pharmacist before using any other medicine, including over-the-counter medicines, vitamins, and herbal products.  Tell your doctor if you are using any medicines that make you sleepy. These include sleeping pills, cold and allergy medicine, narcotic pain relievers and sedatives.     Tell your doctor if you are using any medicine that make you sleepy. These include sleeping pills, davy and allergy medicine, narcotic pain relievers and sedatives.    Do not drink alcohol while you are using this medicine.     Warnings While Using This  Medicine:    Make sure your doctor knows if you are pregnant or breastfeeding or if you have glaucoma, prostate problems, trouble urinating, blocked bowels, liver disease, kidney disease or a history of seizures or mental illness.    This medicine can cause blurring of vision and other vision problems if it comes in contact with the eyes. This medicine may also cause problems with urination. If any of these reactions occur, remove the patch and call your doctor right away.    This medicine may make you dizzy or drowsy. Avoid driving, using machines, or doing anything else that could be dangerous if you are not alert. If you plan to participate in underwater sports, this medicine may cause disorienting effects. If this is a concern for you, talk with your doctor.    This medicine may make you sweat less and cause your body to get too hot. Be careful in hot weather, when you are exercising or if using sauna or whirlpool.    Make sure any doctor or dentist who treats you knows that you are using this medicine. This medicine may affect the results of certain medical tests.    Skin burns have been reported at the patch site in several patients wearing an aluminized transdermal system during a magnetic resonance imaging scan (MRI). Because Transderm Scop contains aluminum, it is recommended to remove the system before undergoing an MRI.    Call your doctor right away if you notice any of these side effects:    Allergic reaction: Itching or hives, swelling in your face or hands, swelling or tingling in your mouth or throat, chest tightness, trouble breathing    Blurred vision    Confusion or memory loss    Fast, slow or uneven heartbeat    Lightheadedness, dizziness, drowsiness or fainting    Seeing, hearing or feeling things that are not there    Severe eye pain    Trouble urinating    If you notice these less serious side effects, talk with your doctor:    Dry mouth    Dry, itchy or red eyes    Restlessness    Skin rash  or redness    If you notice other side effects that you think are caused by this medicine, tell your doctor immediately.    Rev. 4/2014

## 2018-06-04 NOTE — ANESTHESIA CARE TRANSFER NOTE
Patient: Reyna Zhang    Procedure(s):  Bilateral Breast Reduction - Wound Class: I-Clean    Diagnosis: Symptomatic Mammary Hypertrophy   Diagnosis Additional Information: No value filed.    Anesthesia Type:   General, ETT     Note:  Airway :Face Mask  Patient transferred to:PACU  Comments: Pt to PACU, VSS.  Report to RN, questions answered. Handoff Report: Identifed the Patient, Identified the Reponsible Provider, Reviewed the pertinent medical history, Discussed the surgical course, Reviewed Intra-OP anesthesia mangement and issues during anesthesia, Set expectations for post-procedure period and Allowed opportunity for questions and acknowledgement of understanding      Vitals: (Last set prior to Anesthesia Care Transfer)    CRNA VITALS  6/4/2018 1615 - 6/4/2018 1652      6/4/2018             Pulse: 109    SpO2: 100 %    Resp Rate (observed): 15    EKG: Sinus rhythm                Electronically Signed By: DARIUS Chacko CRNA  June 4, 2018  4:52 PM

## 2018-06-04 NOTE — BRIEF OP NOTE
"Brief Op Note    Pre Op Diagnosis: bilateral symptomatic breast hyhpertrophy  Post Op Diagnosis: same  Procedure: bilateral breast reduction  Anesthesia:GET  Surgeon: Anna Ramires MD  Assistant: none  EBL: 100 ml  IV fluids:2000 mls  Urine output:1000 mls  Counts: correct  Specimens: left breast = 1122 gms. Right breast = 1005 gms.  Drain: none  Condition: stable  Findings:very dense breasts. No obvious abnormalities noted grossly.  Received call after surgery began from Dr [facility] regarding last week's mammogram findings for \"possible asymmetry retroareolar on right\" with recommended further imaging studies.      Anna Ramires MD    "

## 2018-06-04 NOTE — ANESTHESIA PREPROCEDURE EVALUATION
Anesthesia Evaluation     . Pt has not had prior anesthetic            ROS/MED HX    ENT/Pulmonary:  - neg pulmonary ROS     Neurologic:  - neg neurologic ROS     Cardiovascular:  - neg cardiovascular ROS       METS/Exercise Tolerance:     Hematologic:  - neg hematologic  ROS       Musculoskeletal:  - neg musculoskeletal ROS       GI/Hepatic:  - neg GI/hepatic ROS       Renal/Genitourinary:  - ROS Renal section negative       Endo:  - neg endo ROS       Psychiatric:  - neg psychiatric ROS       Infectious Disease:  - neg infectious disease ROS       Malignancy:      - no malignancy   Other:                     Physical Exam  Normal systems: cardiovascular, pulmonary and dental    Airway   Mallampati: II  TM distance: >3 FB  Neck ROM: full    Dental     Cardiovascular       Pulmonary                     Anesthesia Plan      History & Physical Review  History and physical reviewed and following examination; no interval change.    ASA Status:  1 .        Plan for General and ETT with Propofol induction. Maintenance will be Balanced.    PONV prophylaxis:  Ondansetron (or other 5HT-3), Dexamethasone or Solumedrol and Scopolamine patch  Additional equipment: Videolaryngoscope Please run low dose propofol      Postoperative Care  Postoperative pain management:  IV analgesics and Oral pain medications.      Consents  Anesthetic plan, risks, benefits and alternatives discussed with:  Patient.  Use of blood products discussed: Yes.   Use of blood products discussed with Patient.  Consented to blood products.  .                          .

## 2018-06-04 NOTE — IP AVS SNAPSHOT
MRN:[id number]                      After Visit Summary   6/4/2018    Reyna Zhang    MRN: [id number]           Thank you!     Thank you for choosing [facility] for your care. Our goal is always to provide you with excellent care. Hearing back from our patients is one way we can continue to improve our services. Please take a few minutes to complete the written survey that you may receive in the mail after you visit with us. Thank you!        Patient Information     Date Of Birth          1980        About your hospital stay     You were admitted on:  June 4, 2018 You last received care in the:  [facility] PACU    You were discharged on:  June 4, 2018        Reason for your hospital stay       S/p bilateral breast reduction.  Tolerated well under GA.  OK to dc home as per anesthesia criteria.                  Who to Call     For medical emergencies, please call 911.  For non-urgent questions about your medical care, please call your primary care provider or clinic, [phone]  For questions related to your surgery, please call your surgery clinic        Attending Provider     Provider Specialty    Hannah Ramires MD Plastic Surgery       Primary Care Provider Office Phone # Fax #    [facility] [phone]       When to contact your care team       Call plastic surgery on call ([phone])  if you have any of the following:   Increasing unilateral swelling or copious drainage. Unbearable unilateral pain. Fever >102.5. Protracted vomiting unable to keep down fluids and meds. Nipples turning either very pale (lack of blood flow) or black.                  After Care Instructions     Activity       Your activity upon discharge: no heavy lifting >5 lbs x 3 weeks minimum.  Avoid excessive stretching/reaching or use of arms.  Avoid direct trauma to surgical sites.  Do NOT sleep on tummy.            Diet       Follow this diet upon discharge: Advance to a regular  diet as tolerated            Supplies       List the supplies the pt needs to go home: additional ACE wraps from OR and a couple kerlix rolls PLEASE. THANKS!            Wound care and dressings       Instructions to care for your wound at home: as directed.  Keep breasts wrapped with ACE wrap for at least 2 days before removing to shower. OK to loosen if too tight or tighten if too loose. Do NOT use ice packs on breasts.  After shower, pad breast  [place] with gauze and rewrap ACE. Keep using ACE until clinic follow up appointment. The tape/glue will start to peel off in about 2-3 weeks. OK to trim loose ends as needed.                  Follow-up Appointments     Follow Up and recommended labs and tests       Follow up with me,  Hannah Ramires, within 1-3 weeks. to evaluate after surgery.  No follow up labs or test are needed.                  Your next 10 appointments already scheduled     Jun 19, 2018 10:00 AM CDT   (Arrive by 9:45 AM)   Post-Op with Hannah Ramires MD   [facility] Plastic [facility]ive [facility] ([facility] [place])    [address] [place] [address] [id number] [phone]              Further instructions from your care team       Caring for Your Incision    You ll need to care for your incision after surgery and certain medical procedures. To close an incision, your doctor used sutures (stitches), steri-strips, staples, or dermabond. Follow the tips on this sheet to help heal and prevent infection of your incision.   Types of Incision Closure:    Surgical Sutures (stitches) are placed by sewing the edges of an incision together with surgical thread. Sutures are either absorbable or non-absorbable. Absorbable sutures break down in the body over time. Non-absorbable sutures need to be removed.     Steri-strips are made of adhesive (sticky) material to help hold the edges of an incision together. Steri-strips usually fall off by themselves in 7 to 10  days.     Surgical Staples are made or steel or titanium. They are often used to close shallow incisions. They are not used on certain body areas, such as the face and hands. This is because these areas have nerves that are close to the surface. Staples are usually removed within a week.     Dermabond (skin glue) is used to close a cut or small incision. The skin glue is less painful than stitches (sutures). In some cases, a lower layer of skin may be sutured before Dermabond is applied. The skin glue closes the cut/incision within a few minutes. It also provides a water-resistant covering. No bandage is required. Dermabond peels off on its own within 5 to 10 days.  Home Care for Your  Incision:    Keep the incision clean and dry. You should bathe only as directed by the doctor. It is okay to wash around the incision, but don t spray water directly on it.     Check the incision site daily for pain, redness, drainage, swelling, or separation of the incision edges.     If you have a dressing over the incision, change the dressing as directed by the doctor.    Make sure any clothing that touches the incision is loose fitting. This will prevent rubbing. If the incision is on the head, avoid wearing caps or other head coverings. These may rub against the incision.    Avoid rough play, contact sports, or physical activities. This can put you at risk of opening an incision.     As your incision heals, the skin may appear pink or red. It may also feel slightly bumpy or raised. This is called a healing ridge. Over time, the color should fade and the raised skin will become less noticeable.   Call the doctor right away if you have any of the following:    Increased pain, redness, drainage, swelling or bleeding at the incision site    Numbness, coldness or tingling around the incision site    Fever of 101 F degrees or higher  Rev. 4/2014    Same-Day Surgery   Adult Discharge Orders & Instructions     For 24 hours after  surgery:  1. Get plenty of rest.  A responsible adult must stay with you for at least 24 hours after you leave the hospital.   2. Pain medication can slow your reflexes. Do not drive or use heavy equipment.  If you have weakness or tingling, don't drive or use heavy equipment until this feeling goes away.  3. Mixing alcohol and pain medication can cause dizziness and slow your breathing. It can even be fatal. Do not drink alcohol while taking pain medication.  4. Avoid strenuous or risky activities.  Ask for help when climbing stairs.   5. You may feel lightheaded.  If so, sit for a few minutes before standing.  Have someone help you get up.   6. If you have nausea (feel sick to your stomach), drink only clear liquids such as apple juice, ginger ale, broth or 7-Up.  Rest may also help.  Be sure to drink enough fluids.  Move to a regular diet as you feel able. Take pain medications with a small amount of solid food, such as toast or crackers, to avoid nausea.   7. A slight fever is normal. Call the doctor if your fever is over 100 F (37.7 C) (taken under the tongue) or lasts longer than 24 hours.  8. You may have a dry mouth, muscle aches, trouble sleeping or a sore throat.  These symptoms should go away after 24 hours.  9. Do not make important or legal decisions.   Pain Management:      1. Take pain medication (if prescribed) for pain as directed by your physician.        2. WARNING: If the pain medication you have been prescribed contains Tylenol  (acetaminophen), DO NOT take additional doses of Tylenol (acetaminophen).     Call your doctor for any of the followin.  Signs of infection (fever, growing tenderness at the surgery site, severe pain, a large amount of drainage or bleeding, foul-smelling drainage, redness, swelling).    2.  It has been over 8 to 10 hours since surgery and you are still not able to urinate (pee).    3.  Headache for over 24 hours.    4.  Numbness, tingling or weakness the day after  surgery (if you had spinal anesthesia).  To contact a doctor, call _____________________________________ or:      [phone] and ask for the Resident On Call for:          Plastic Surgery  (answered 24 hours a day)      Emergency Department:  [facility]: [phone]  [facility]: [phone]               Rev. 10/2014     Scopolamine Patch  (Absorbed through the skin)    The Scopolamine Patch prevents nausea and vomiting caused by motion sickness or anesthesia and surgery in adults.    Brand Name(s): Transderm Scop, Transderm-Scope  There may be other brand names for this medicine.    When This Medicine Should Not Be Used:  You should not use this medicine if you have had an allergic reaction to scopolamine, or if you have narrow angle glaucoma.    How to Use This Medicine:    Your doctor will tell you how many patches to use, where to apply them, and how often to apply them.     Do not use more patches or apply them more often than your doctor tells you to.    This medicine comes with patient instructions. Read and follow these instructions carefully. Ask your doctor or pharmacist if you have any questions.    To prevent motion sickness, apply the patch at least 4 hours before you need it.    Wash and dry your hands thoroughly before applying the patch.    Leave the patch in its sealed wrapper until you are ready to put it on. Tear the wrapper open carefully. NEVER CUT the wrapper or the patch with scissors. Do not use any patch that has been cut by accident.    Take the liner off the sticky side before applying.    Apply the patch to dry, hairless skin behind the ear.    If the patch is loose or falls off, apply a new patch at a different place behind the ear.    After you take off the patch, wash the place where the patch was and your hands thoroughly.    Only one patch should be used at any time.    If a dose is missed:  If you forget to wear or change a patch, put one  on as soon as you can. If it is almost time to put on your next patch, wait until then to apply a new patch and skip the one you missed. Do not apply extra patches to make up for a missed dose.    How to Store and Dispose of This Medicine:    Store the patches at room temperature in a closed container, away from heat, moisture and direct light.    Fold the used patch in half with the sticky sides together. Throw any used patch away so that children or pets cannot get to it. You will also need to throw away old patches after the expiration date has passed.    Keep all medicine away from children and never share your medicine with anyone.    Ask your doctor or pharmacist before using any other medicine, including over-the-counter medicines, vitamins, and herbal products.  Tell your doctor if you are using any medicines that make you sleepy. These include sleeping pills, cold and allergy medicine, narcotic pain relievers and sedatives.     Tell your doctor if you are using any medicine that make you sleepy. These include sleeping pills, davy and allergy medicine, narcotic pain relievers and sedatives.    Do not drink alcohol while you are using this medicine.     Warnings While Using This Medicine:    Make sure your doctor knows if you are pregnant or breastfeeding or if you have glaucoma, prostate problems, trouble urinating, blocked bowels, liver disease, kidney disease or a history of seizures or mental illness.    This medicine can cause blurring of vision and other vision problems if it comes in contact with the eyes. This medicine may also cause problems with urination. If any of these reactions occur, remove the patch and call your doctor right away.    This medicine may make you dizzy or drowsy. Avoid driving, using machines, or doing anything else that could be dangerous if you are not alert. If you plan to participate in underwater sports, this medicine may cause disorienting effects. If this is a concern for  you, talk with your doctor.    This medicine may make you sweat less and cause your body to get too hot. Be careful in hot weather, when you are exercising or if using sauna or whirlpool.    Make sure any doctor or dentist who treats you knows that you are using this medicine. This medicine may affect the results of certain medical tests.    Skin burns have been reported at the patch site in several patients wearing an aluminized transdermal system during a magnetic resonance imaging scan (MRI). Because Transderm Scop contains aluminum, it is recommended to remove the system before undergoing an MRI.    Call your doctor right away if you notice any of these side effects:    Allergic reaction: Itching or hives, swelling in your face or hands, swelling or tingling in your mouth or throat, chest tightness, trouble breathing    Blurred vision    Confusion or memory loss    Fast, slow or uneven heartbeat    Lightheadedness, dizziness, drowsiness or fainting    Seeing, hearing or feeling things that are not there    Severe eye pain    Trouble urinating    If you notice these less serious side effects, talk with your doctor:    Dry mouth    Dry, itchy or red eyes    Restlessness    Skin rash or redness    If you notice other side effects that you think are caused by this medicine, tell your doctor immediately.    Rev. 4/2014        Additional Information     If you use hormonal birth control (such as the pill, patch, ring or implants): You'll need a second form of birth control for 7 days (condoms, a diaphragm or contraceptive foam). While in the hospital, you received a medicine called Bridion. Your normal birth control will not work as well for a week after taking this medicine.          Pending Results     No orders found from 6/2/2018 to 6/5/2018.            Admission Information     Date & Time Provider Department Dept. Phone    6/4/2018 Hannah Ramires MD [facility] PACU [phone]      Your Vitals Were     Blood  "Pressure Pulse Temperature Respirations Height Weight    107/71 103 98.6  F (37  C) (Oral) 10 1.6 m (5' 3\") 74.4 kg (164 lb 0.4 oz)    Last Period Pulse Oximetry BMI (Body Mass Index)             05/30/2018 100% 29.06 kg/m2         [employer] Information     [employer] gives you secure access to your electronic health record. If you see a primary care provider, you can also send messages to your care team and make appointments. If you have questions, please call your primary care clinic.  If you do not have a primary care provider, please call [phone] and they will assist you.        Care EveryWhere ID     This is your Care EveryWhere ID. This could be used by other organizations to access your [facility] medical records  [id number]        Equal Access to Services     QUYNH CHOI : Isaura Núñez, justina monroy, mohsen carrion, lam ramey . So [facility] [phone].    ATENCIÓN: Si habla español, tiene a martinez disposición servicios gratuitos de asistencia lingüística. Llame al [phone].    We comply with applicable federal civil rights laws and [place] laws. We do not discriminate on the basis of race, color, national origin, age, disability, sex, sexual orientation, or gender identity.               Review of your medicines      START taking        Dose / Directions    hydrOXYzine 25 MG tablet   Commonly known as:  ATARAX   Used for:  S/P bilateral breast reduction        Dose:  25 mg   Take 1 tablet (25 mg) by mouth 3 times daily as needed for itching   Quantity:  20 tablet   Refills:  1       ondansetron 4 MG ODT tab   Commonly known as:  ZOFRAN ODT   Used for:  S/P bilateral breast reduction        Dose:  4 mg   Take 1 tablet (4 mg) by mouth every 8 hours as needed for nausea   Quantity:  20 tablet   Refills:  1       oxyCODONE IR 5 MG tablet   Commonly known as:  ROXICODONE   Used for:  S/P bilateral breast reduction        Dose:  5-10 mg   Take 1-2 " tablets (5-10 mg) by mouth every 6 hours as needed for pain   Quantity:  50 tablet   Refills:  0         CONTINUE these medicines which have NOT CHANGED        Dose / Directions    ALPRAZolam 0.25 MG tablet   Commonly known as:  XANAX   Used for:  Flying phobia        Dose:  0.25-0.5 mg   Take 1-2 tablets (0.25-0.5 mg) by mouth every 6 hours as needed for anxiety   Quantity:  10 tablet   Refills:  0       amitriptyline 25 MG tablet   Commonly known as:  ELAVIL   Used for:  Cervicalgia        Dose:  25-50 mg   Take 1-2 tablets (25-50 mg) by mouth At Bedtime   Quantity:  90 tablet   Refills:  1            Where to get your medicines      These medications were sent to [facility] [place], [place] - [address]  [address] [address]     Phone:  [phone]     hydrOXYzine 25 MG tablet    ondansetron 4 MG ODT tab         Some of these will need a paper prescription and others can be bought over the counter. Ask your nurse if you have questions.     Bring a paper prescription for each of these medications     oxyCODONE IR 5 MG tablet                Protect others around you: Learn how to safely use, store and throw away your medicines at www.disposemymeds.org.        Information about OPIOIDS     PRESCRIPTION OPIOIDS: WHAT YOU NEED TO KNOW   You have a prescription for an opioid (narcotic) pain medicine. Opioids can cause addiction. If you have a history of chemical dependency of any type, you are at a higher risk of becoming addicted to opioids. Only take this medicine after all other options have been tried. Take it for as short a time and as few doses as possible.     Do not:    Drive. If you drive while taking these medicines, you could be arrested for driving under the influence (DUI).    Operate heavy machinery    Do any other dangerous activities while taking these medicines.     Drink any alcohol while taking these medicines.      Take with any other medicines that  contain acetaminophen. Read all labels carefully. Look for the word  acetaminophen  or  Tylenol.  Ask your pharmacist if you have questions or are unsure.    Store your pills in a secure place, locked if possible. We will not replace any lost or stolen medicine. If you don t finish your medicine, please throw away (dispose) as directed by your pharmacist. The [place] Pollution Control Agency has more information about safe disposal: https://www.pca.[facility].[place]/living-green/managing-unwanted-medications    All opioids tend to cause constipation. Drink plenty of water and eat foods that have a lot of fiber, such as fruits, vegetables, prune juice, apple juice and high-fiber cereal. Take a laxative (Miralax, milk of magnesia, Colace, Senna) if you don t move your bowels at least every other day.              Medication List: This is a list of all your medications and when to take them. Check marks below indicate your daily home schedule. Keep this list as a reference.      Medications           Morning Afternoon Evening Bedtime As Needed    ALPRAZolam 0.25 MG tablet   Commonly known as:  XANAX   Take 1-2 tablets (0.25-0.5 mg) by mouth every 6 hours as needed for anxiety                                amitriptyline 25 MG tablet   Commonly known as:  ELAVIL   Take 1-2 tablets (25-50 mg) by mouth At Bedtime                                hydrOXYzine 25 MG tablet   Commonly known as:  ATARAX   Take 1 tablet (25 mg) by mouth 3 times daily as needed for itching                                ondansetron 4 MG ODT tab   Commonly known as:  ZOFRAN ODT   Take 1 tablet (4 mg) by mouth every 8 hours as needed for nausea                                oxyCODONE IR 5 MG tablet   Commonly known as:  ROXICODONE   Take 1-2 tablets (5-10 mg) by mouth every 6 hours as needed for pain

## 2018-06-05 NOTE — ANESTHESIA POSTPROCEDURE EVALUATION
Patient: Reyna Zhang    Procedure(s):  Bilateral Breast Reduction - Wound Class: I-Clean    Diagnosis:Symptomatic Mammary Hypertrophy   Diagnosis Additional Information: No value filed.    Anesthesia Type:  General, ETT    Note:  Anesthesia Post Evaluation    Patient location during evaluation: PACU  Patient participation: Able to fully participate in evaluation  Level of consciousness: awake and alert  Pain management: adequate  Airway patency: patent  Cardiovascular status: acceptable  Respiratory status: acceptable  Hydration status: acceptable  PONV: none     Anesthetic complications: None          Last vitals:  Vitals:    06/04/18 1800 06/04/18 1830 06/04/18 1900   BP: 114/67 119/72 111/63   Pulse:      Resp: 16 16 14   Temp:      SpO2: 94% 93% 94%         Electronically Signed By: Sarah Melo MD  June 4, 2018  7:23 PM

## 2018-06-11 LAB — COPATH REPORT: NORMAL

## 2018-06-11 NOTE — OP NOTE
Procedure Date: 06/04/2018      ATTENDING:  Keyla.      PREOPERATIVE DIAGNOSIS:  Symptomatic bilateral breast hypertrophy.      POSTOPERATIVE DIAGNOSIS:  Symptomatic bilateral breast hypertrophy.      PROCEDURE:  Bilateral breast reduction mammoplasty with preservation of nipples.      ANESTHESIA:  GET.      ESTIMATED BLOOD LOSS:  100 mL.      IV FLUIDS:  2000 mL.      URINE OUTPUT:  1000 mL.      COUNTS:  Correct.      COMPLICATIONS:  None.      DRAINS:  None.      SPECIMEN:  Right breast 1005 g, left breast 1122 grams.      INDICATIONS:  This is a 38-year-old female who met insurance criteria for bilateral breast reduction.  She presented with symptomatic hypertrophy that was interfering with her ability to do much of the athletic activities that she enjoys.  She also had significant symptoms with regard to neck and back pain.  Preoperative mammogram was achieved prior to surgery, but only incompletely interpreted.  There was a reading for possible asymmetry in the retroareolar area on the right, which was only identified after that portion of the surgery had already been completed.      DESCRIPTION OF PROCEDURE:  The patient was seen in the preoperative waiting area.  The operative sites were marked.  This included sternal notch, sternal midline, and a Wise pattern with extensions laterally for lateral thoracic rolls.  Of note, this patient did a lot of preoperative preparation to determine what size that she would like to have remaining or how much tissue she wanted to remove.  With regard to the Schnur scale and her insurance coverage, we only required approximately 400 g to be removed.  She made a masking tape model or mold of her breast and also did experiments with bags of rice and felt that she wanted only about 400 grams remaining if at all possible.  She also presented us with a jamie collection of sample photos of both patients as well as models to show us the kind of breast size and shape that she  was hoping for.  This was very helpful and we did review this prior to her surgery that morning.  Of note, she was extremely anxious regarding anesthesia rather than the surgery itself.  Both the MDA and CRNA spent considerable time to help allay her fears.  Once all of her questions and concerns were addressed, as well as those of her  and her best friend, we brought the patient back to the Operating Room.  She was placed supine on the OR table and general anesthesia was administered.  We then placed a Che catheter.  She already had sequential compression devices on her lower extremities prior to induction.  Her arms were secured to arm boards with Ace wraps.  Additional padding and safety straps were placed over her thighs and forelegs.  The chest/breast area was then prepped and draped in the usual sterile fashion, taking care not to remove the preoperative markings.  Of note, just prior to prepping the patient was put into a sitting position on the table to assess for symmetry and corrections were made as necessary.  When she was prepped and draped, we did a timeout.  We identified the proper patient and procedure.  We started on the right side and a 10 cm wide inferior pedicle was drawn.  A 38 mm nipple cookie cutter was used to ink out the new nipple areolar complex size.  We then used approximately 15 mL of 1% lidocaine with epinephrine to essentially hydrodissect the skin to be de-epithelialized on the inferior pedicle.  The breast was then put into the position with a lap sponge at the base, held as high as possible to provide additional support for the de-epithelialization.  This was done with a 10 blade and great care was taken to avoid damaging the nipple.  When this skin was removed this was added to our ongoing weight.  We then removed the sponge at the base and made our incisions for her superior skin flaps.  These were then further developed with electrocautery after using the scalpel  through the skin on the left side and the peak plasma blade on the right side.  We had first started with thick flaps at least 2-3 cm in thickness with the understanding that we would most likely take more off the symmetry since her left breast was at least 200-300 grams larger.  When we were happy with our superior skin flaps, we then excised or resected the excess breast tissue around the pedicle.  Great care was taken to preserve the blood flow through the inferior pedicle.  All breast tissue was added to the passed off room and measured on the back table.  While there were pockets of very dense tissue, nothing appeared to be significantly suspicious.  The skin flaps were then temporarily closed with skin staples and ultimately had a very nice shaped breast mound, this was still too large for what her preoperative wishes were.  We washed this side and began the left side in a similar fashion.  It is at this time that our breast radiologist called into the room to notify us that they had wanted the patient to do further imaging studies of the possible asymmetry in the periareolar area on the right.  Unfortunately we had already started with that breast and it was difficult to tell whether or not our resected tissue included the area in question.  This would need to be discussed with the patient and her  postop.  We continued on with the left side and in a similar fashion created a 10 cm wide inferior based pedicle for a 38 mm nipple areolar complex.  This was infiltrated with local and then de-epithelialized.  The superior skin flaps were created and then the excess tissue was resected from the pedicle.  Once again, her breasts were quite dense in areas, but nothing was grossly abnormal to the eye.  All tissues that we removed were weighed and ultimately sent to pathology.  Since this breast started out being much larger, obviously we had more tissue resected.  In an effort to achieve not only symmetry, but  also the size that the patient had hoped for, which was about 400 grams, additional trimming both of the skin as well as breast tissue was done on both sides including the pedicles as well as the skin flaps.  There was some challenge in doing this due to the difference in size to begin with.  The breast envelope, skin envelope on the right side was much more taut than on the left.  We were, however, able to achieve fairly good symmetry and particularly with the patient in sitting position.  There was, however, a bit of a bulge under the right IMF incision, most likely due to the pressing down of the pedicle within the mound.  When we were happy with our resections and had achieved a fairly good bit of symmetry, both in terms of volume and shape and location, the dissection pockets were irrigated with triple antibiotic solution.  Hemostasis was achieved with cautery.  A decision was made not to put BEKAH drains.  We then started our closure with 3-0 Vicryl deep dermal buried sutures followed by 4-0 Vicryl running subcuticular suture.  The nipple areolar complexes were then brought out through the new incision site at the top of the vertical incision and secured with  3-0 and 4-0 Vicryl deep dermal buried sutures, followed by 5-0 plain gut in a running cuticular suture.  Throughout the case, all incisions with additional narrowing of our pedicles, the nipple areolar complexes remained viable.  Our final weights were right breast 1005 grams and left breast 1122 grams.  These were placed in formalin and sent to pathologist.  Additional discussions with the pathologist were completed that night after surgery as well as the following day when the tissues were processed.  With regard to the incisions, these were then all covered with Dermabond Prineo.  Kerlix roll fluffed nests were made for each breast and then her chest was wrapped circumferentially with a double long 6-inch Ace wrap.  The patient was extubated and the  Che was removed.  She was transferred to a stretcher and taken to the recovery room in satisfactory condition, having tolerated the procedure without difficulty or complication.  This was a fairly lengthy procedure running at least 6.5-7 hours.   Of note, the communication regarding the patient's mammogram and having proceeded with surgery before recommended further imaging studies was discussed with the patient's  once the patient was safely delivered to the PACU. I spoke with the patient directly the following evening when she was more awake.        RELL CHAPIN MD             D: 06/10/2018   T: 06/10/2018   MT: RIP      Name:     CARMEN MAGAÑA   MRN:      [id number]        Account:        [id number]   :      1980           Procedure Date: 2018      Document: [id number]

## 2018-06-15 DIAGNOSIS — Z98.890 S/P BILATERAL BREAST REDUCTION: ICD-10-CM

## 2018-06-15 RX ORDER — OXYCODONE HYDROCHLORIDE 5 MG/1
5-10 TABLET ORAL EVERY 6 HOURS PRN
Qty: 25 TABLET | Refills: 0 | Status: SHIPPED | OUTPATIENT
Start: 2018-06-15 | End: 2018-09-26

## 2018-06-19 ENCOUNTER — OFFICE VISIT (OUTPATIENT)
Dept: PLASTIC SURGERY | Facility: CLINIC | Age: 38
End: 2018-06-19
Payer: COMMERCIAL

## 2018-06-19 DIAGNOSIS — Z98.890 S/P BILATERAL BREAST REDUCTION: Primary | ICD-10-CM

## 2018-06-19 ASSESSMENT — PAIN SCALES - GENERAL: PAINLEVEL: MILD PAIN (3)

## 2018-06-19 NOTE — NURSING NOTE
Chief Complaint   Patient presents with     Surgical Followup     Pt is here for a 2 week post op visit       There were no vitals filed for this visit.    There is no height or weight on file to calculate BMI.      ANETTE Alonzo, EMT                  No vitals taken per provider

## 2018-06-19 NOTE — LETTER
6/19/2018       RE: Reyna Zhang  [address] [place] [id number]     Dear Colleague,    Thank you for referring your patient, Reyna Zhang, to the [facility] PLASTIC AND RECONSTRUCTIVE SURGERY at [facility]. Please see a copy of my visit note below.    PLASTICS POST-OP  This is a 38 year old female with a history of symptomatic breast hypertrophy who presents 2 weeks after bilateral breast reduction.She has been weaning off oxycodone, and began cutting them in half.  She is now taking 2.5mg of oxycodone every 6 hours. She had some shakiness and was unsure if that was due to the oxycodone. Patient denies nausea and has not needed any Zofran. Denies any pruritis. She reports that the right nipple is very sensitive, while the left nipple has almost no sensation. There is some left sided swelling; she reports that ice has improved the swelling, but it also fluctuates with activity. Since the surgery she has not noticed the neck and back pain she had preop.     Vitals: LMP 05/30/2018  PE: Chest: Prineo intact. Slight drainage from the right T-junction.The left breast is slightly bigger than right, unclear whether this is edema or seroma. Nipples appear viable, but left side is numb and the right side is hyper sensitive. Overall decent shape and size for patient's desires.  Photos in EPIC.     A&P: 38 year old female 2 weeks after a bilateral breast reduction. She can now begin taking Advil with the assumption that her left breast swelling is not a hematoma. She will continue to wrap with an ACE bandage and use ice PRN, and for another week will not lift more than 5 pounds and will be minimally active. Resumption of increasing activities should be gradual and cognizant of healing process. I will place an order for a breast MRI to be done in 4 weeks. She will return for follow-up of scars, sensation and swelling in 4-5 weeks. She had several questions, all of which were  answered to her satisfaction. She inquired whether a bath is okay at this point, which I affirmed.  Pathology on the breast tissue was negative.     Total time = 20 minutes, over half of which spent discussing scar care and medical education.     This note was prepared on behalf of Dr. Ramires by Joslyn Stiles, a trained medical scribe, based on the provider's statements to me.        Again, thank you for allowing me to participate in the care of your patient.      Sincerely,    Hannah Ramires MD

## 2018-06-19 NOTE — MR AVS SNAPSHOT
After Visit Summary   6/19/2018    Reyan Zhang    MRN: [id number]           Patient Information     Date Of Birth          1980        Visit Information        Provider Department      6/19/2018 10:00 AM Hannah Ramires MD [facility] Plastic and Reconstructive Surgery        Today's Diagnoses     S/P bilateral breast reduction    -  1       Follow-ups after your visit        Your next 10 appointments already scheduled     Jul 17, 2018 12:30 PM CDT   (Arrive by 12:15 PM)   Post-Op with Hannah Ramires MD   [facility] Plastic and Reconstructive Surgery ([facility])    [address] [place] [id number] [phone]              Who to contact     Please call your clinic at [phone] to:    Ask questions about your health    Make or cancel appointments    Discuss your medicines    Learn about your test results    Speak to your doctor            Additional Information About Your Visit        MyChart Information     [employer] gives you secure access to your electronic health record. If you see a primary care provider, you can also send messages to your care team and make appointments. If you have questions, please call your primary care clinic.  If you do not have a primary care provider, please call [phone] and they will assist you.      [employer] is an electronic gateway that provides easy, online access to your medical records. With [employer], you can request a clinic appointment, read your test results, renew a prescription or communicate with your care team.     To access your existing account, please contact your [facility] or call [phone] for assistance.        Care EveryWhere ID     This is your Care EveryWhere ID. This could be used by other organizations to access your [facility] medical records  [id number]        Your Vitals Were     Last Period                   05/30/2018            Blood  Pressure from Last 3 Encounters:   06/04/18 100/65   05/24/18 100/70   02/06/18 132/86    Weight from Last 3 Encounters:   06/04/18 164 lb 0.4 oz   05/24/18 161 lb 4.8 oz   02/06/18 156 lb 3.2 oz              Today, you had the following     No orders found for display         Today's Medication Changes          These changes are accurate as of 6/19/18 11:59 PM.  If you have any questions, ask your nurse or doctor.               Stop taking these medicines if you haven't already. Please contact your care team if you have questions.     hydrOXYzine 25 MG tablet   Commonly known as:  ATARAX   Stopped by:  Hannah Ramires MD                    Primary Care Provider Office Phone # Fax #    Juana DARIUS Calabrese [facility] [phone]       [address] [facility] [address]  [facility] [address]        Equal Access to Services     [facility]: Hadii aad ku hadasho Soomaali, waaxda luqadaha, qaybta kaalmada adeegyada, waxay annaleein haybeen donna ramey . So [facility] [phone].    ATENCIÓN: Si habla español, tiene a martinez disposición servicios gratuitos de asistencia lingüística. Tamara al [phone].    We comply with applicable federal civil rights laws and [place] laws. We do not discriminate on the basis of race, color, national origin, age, disability, sex, sexual orientation, or gender identity.            Thank you!     Thank you for choosing [facility] PLASTIC AND RECONSTRUCTIVE SURGERY  for your care. Our goal is always to provide you with excellent care. Hearing back from our patients is one way we can continue to improve our services. Please take a few minutes to complete the written survey that you may receive in the mail after your visit with us. Thank you!             Your Updated Medication List - Protect others around you: Learn how to safely use, store and throw away your medicines at www.disposemymeds.org.          This list is accurate as of 6/19/18 11:59 PM.  Always use your most recent med  list.                   Brand Name Dispense Instructions for use Diagnosis    ALPRAZolam 0.25 MG tablet    XANAX    10 tablet    Take 1-2 tablets (0.25-0.5 mg) by mouth every 6 hours as needed for anxiety    Flying phobia       amitriptyline 25 MG tablet    ELAVIL    90 tablet    Take 1-2 tablets (25-50 mg) by mouth At Bedtime    Cervicalgia       oxyCODONE IR 5 MG tablet    ROXICODONE    25 tablet    Take 1-2 tablets (5-10 mg) by mouth every 6 hours as needed for pain    S/P bilateral breast reduction

## 2018-06-19 NOTE — PROGRESS NOTES
PLASTICS POST-OP  This is a 38 year old female with a history of symptomatic breast hypertrophy who presents 2 weeks after bilateral breast reduction.She has been weaning off oxycodone, and began cutting them in half.  She is now taking 2.5mg of oxycodone every 6 hours. She had some shakiness and was unsure if that was due to the oxycodone. Patient denies nausea and has not needed any Zofran. Denies any pruritis. She reports that the right nipple is very sensitive, while the left nipple has almost no sensation. There is some left sided swelling; she reports that ice has improved the swelling, but it also fluctuates with activity. Since the surgery she has not noticed the neck and back pain she had preop.     Vitals: LMP 05/30/2018  PE: Chest: Prineo intact. Slight drainage from the right T-junction.The left breast is slightly bigger than right, unclear whether this is edema or seroma. Nipples appear viable, but left side is numb and the right side is hyper sensitive. Overall decent shape and size for patient's desires.  Photos in EPIC.     A&P: 38 year old female 2 weeks after a bilateral breast reduction. She can now begin taking Advil with the assumption that her left breast swelling is not a hematoma. She will continue to wrap with an ACE bandage and use ice PRN, and for another week will not lift more than 5 pounds and will be minimally active. Resumption of increasing activities should be gradual and cognizant of healing process. I will place an order for a breast MRI to be done in 4 weeks. She will return for follow-up of scars, sensation and swelling in 4-5 weeks. She had several questions, all of which were answered to her satisfaction. She inquired whether a bath is okay at this point, which I affirmed.  Pathology on the breast tissue was negative.     Total time = 20 minutes, over half of which spent discussing scar care and medical education.     This note was prepared on behalf of Dr. Ramires by Joslyn  Linsey, a trained medical scribe, based on the provider's statements to me.

## 2018-06-28 ENCOUNTER — TELEPHONE (OUTPATIENT)
Dept: MAMMOGRAPHY | Facility: CLINIC | Age: 38
End: 2018-06-28

## 2018-06-28 NOTE — TELEPHONE ENCOUNTER
Left a message for Reyna regarding scheduling the follow up imaging recommended from her screening mammogram that she had prior to surgery.  Awaiting return phone call.

## 2018-07-03 DIAGNOSIS — R92.8 ABNORMALITY OF RIGHT BREAST ON SCREENING MAMMOGRAPHY: Primary | ICD-10-CM

## 2018-07-09 ENCOUNTER — TELEPHONE (OUTPATIENT)
Dept: MAMMOGRAPHY | Facility: CLINIC | Age: 38
End: 2018-07-09

## 2018-07-09 NOTE — TELEPHONE ENCOUNTER
Left a message for Reyna returning her phone call regarding the need for additional breast imaging post her reduction surgery.  Writer was unaware of Dr. Ramires's recommendation of a breast MRI rather than a repeat mammogram and ultrasound.  In message, writer told Reyna to continue with Dr. Ramires's recommendation of a breast MRI in 3-6 months post surgery.  Order had been placed.  Left Reyna the number to call to schedule her breast MRI.  Also included number for her to call the [facility] back if she had any additional questions or concerns.

## 2018-07-17 ENCOUNTER — OFFICE VISIT (OUTPATIENT)
Dept: PLASTIC SURGERY | Facility: CLINIC | Age: 38
End: 2018-07-17
Payer: COMMERCIAL

## 2018-07-17 DIAGNOSIS — Z98.890 S/P BILATERAL BREAST REDUCTION: Primary | ICD-10-CM

## 2018-07-17 ASSESSMENT — PAIN SCALES - GENERAL: PAINLEVEL: MODERATE PAIN (5)

## 2018-07-17 NOTE — NURSING NOTE
Chief Complaint   Patient presents with     Surgical Followup     Pt here for 6 week post op       There were no vitals filed for this visit.    There is no height or weight on file to calculate BMI.      ANETTE Alonzo, EMT                    No vitals taken per provider

## 2018-07-17 NOTE — PROGRESS NOTES
"PLASTICS POST-OP  This is a 38 year old female with a history of symptomatic breast hypertrophy who presents 6 weeks post-op after a bilateral breast reduction. She recently had an opening in the incision at the right \"T\" junction. She has been walking, but gets sore in the upper body before she gets fatigued. Reyna reports the left nipple is insensate and the right is hypersensitive. She had questions about when to get an MRI, at 6 weeks versus 3 or 6 months since she did not complete diagnostic imaging preop for a noted \"asymmetry\" on screening mammo. She is also moving this week and she wonders how much lifting she can do.     Vitals: There were no vitals taken for this visit.  PE: General: Height: Data Unavailable Weight: 0 lbs 0 oz Somewhat patterson on the left side medially. Right T junction with a small <1cm area of skin loss with some scabbed dermis. Otherwise incisions healing nicely and nipples viable albeit variable sensation.  Some fullness laterally like a small dogear.    A&P: 38 year old female who presents 6 weeks after a bilateral breast reduction.   I discussed with Reyna that she should wait until 8 weeks post op until she does heavy lifting related to moving. She also wondered whether she can go to the chiropractor (does deep tissue work), which per our discussion she should wait to do until 8 weeks post op. She wondered whether she can use ice on the wound area, which I suggested she use elsewhere but not on the wound. She should use NSAIDs for pain and discomfort. I reassured her that the opening at the right T junction should heal on its own. She will follow-up 3 months post-op. We will continue to observe any return of sensation for the left nipple.    I will clarify when she should have her postop imaging done - 6 weeks vs 3-6 months and will text her when I have talked with Dr West. The MRI order is in the system.     Total time = 20 minutes, greater than half spent educating patient " about her ongoing recovery process.    This note was prepared on behalf of Anna Ramires MD, by Joslyn Stiles, a trained medical scribe, based on the provider's statements to me.

## 2018-07-17 NOTE — LETTER
"7/17/2018       RE: Reyna Zhang  [address] [place] [id number]     Dear Colleague,    Thank you for referring your patient, Reyna Zhang, to the [facility] PLASTIC AND RECONSTRUCTIVE SURGERY at [facility]. Please see a copy of my visit note below.    PLASTICS POST-OP  This is a 38 year old female with a history of symptomatic breast hypertrophy who presents 6 weeks post-op after a bilateral breast reduction. She recently had an opening in the incision at the right \"T\" junction. She has been walking, but gets sore in the upper body before she gets fatigued. Reyna reports the left nipple is insensate and the right is hypersensitive. She had questions about when to get an MRI, at 6 weeks versus 3 or 6 months since she did not complete diagnostic imaging preop for a noted \"asymmetry\" on screening mammo. She is also moving this week and she wonders how much lifting she can do.     Vitals: There were no vitals taken for this visit.  PE: General: Height: Data Unavailable Weight: 0 lbs 0 oz Somewhat patterson on the left side medially. Right T junction with a small <1cm area of skin loss with some scabbed dermis. Otherwise incisions healing nicely and nipples viable albeit variable sensation.  Some fullness laterally like a small dogear.    A&P: 38 year old female who presents 6 weeks after a bilateral breast reduction.   I discussed with Reyna that she should wait until 8 weeks post op until she does heavy lifting related to moving. She also wondered whether she can go to the chiropractor (does deep tissue work), which per our discussion she should wait to do until 8 weeks post op. She wondered whether she can use ice on the wound area, which I suggested she use elsewhere but not on the wound. She should use NSAIDs for pain and discomfort. I reassured her that the opening at the right T junction should heal on its own. She will follow-up 3 months post-op. We will " continue to observe any return of sensation for the left nipple.    I will clarify when she should have her postop imaging done - 6 weeks vs 3-6 months and will text her when I have talked with Dr West. The MRI order is in the system.     Total time = 20 minutes, greater than half spent educating patient about her ongoing recovery process.    This note was prepared on behalf of Anna Ramires MD, by Joslyn Stiles, a trained medical scribe, based on the provider's statements to me.        Again, thank you for allowing me to participate in the care of your patient.      Sincerely,    Hannah Ramires MD

## 2018-09-20 ENCOUNTER — RADIANT APPOINTMENT (OUTPATIENT)
Dept: MRI IMAGING | Facility: CLINIC | Age: 38
End: 2018-09-20
Attending: SURGERY
Payer: COMMERCIAL

## 2018-09-20 DIAGNOSIS — R92.8 ABNORMALITY OF RIGHT BREAST ON SCREENING MAMMOGRAPHY: ICD-10-CM

## 2018-09-20 RX ORDER — GADOBUTROL 604.72 MG/ML
7.5 INJECTION INTRAVENOUS ONCE
Status: COMPLETED | OUTPATIENT
Start: 2018-09-20 | End: 2018-09-20

## 2018-09-20 RX ADMIN — GADOBUTROL 7.5 ML: 604.72 INJECTION INTRAVENOUS at 10:30

## 2018-09-25 NOTE — PROGRESS NOTES
NEW CONSULTATION   Sep 26, 2018     Reyna Zhang is a 38 year old woman who presents following recent breast MRI She was referred by Dr. Ro.    HPI:    She had a screening mammogram 2018 that demonstrated a right breast possible focal asymmetry, BI-RADS 0. She did not complete diagnostic imaging. In 2018 she had a bilateral breast reduction, pathology negative. She recently on 18 had a negative breast MRI.     BREAST-SPECIFIC HISTORY:    Previous breast imaging: Yes   - 18 Smammo: possible right breast focal asymmetry retroareolar BI-RADS 0  - 18 breast MRI BI-RADS 2  Prior breast biopsies/surgeries: Yes   - 2019 mastoplasty reduction: negative  Prior history of breast cancer: No  Prior radiation history: No  Self breast exams: No  Breast density: heterogeneously dense    GYN HISTORY:  . Age at 1st pregnancy: 28. Breastfeeding history: Yes. 2 years  Age at menarche: 11  Menopausal: premenopausal.   Menopausal hormone replacement therapy: no    RISK ASSESSMENT: < 20% lifetime risk     FAMILY HISTORY:  Breast ca: No  Ovarian ca: No  Pancreatic ca: No  Prostate: Yes   - father, 60-70's  - paternal grandfather 60-70's  Gastric ca: No  Melanoma: No  Colon ca: No  Other cancer: No  Other genetic, testing, syndromes, or clotting disorders: no     PAST MEDICAL HISTORY  No past medical history on file.    PAST SURGICAL HISTORY   Past Surgical History:   Procedure Laterality Date     MAMMOPLASTY REDUCTION BILATERAL Bilateral 2018    Procedure: MAMMOPLASTY REDUCTION BILATERAL;  Bilateral Breast Reduction;  Surgeon: Hannah Ramires MD;  Location:  OR     MEDICATIONS  Current Outpatient Prescriptions   Medication Sig Dispense Refill     ALPRAZolam (XANAX) 0.25 MG tablet Take 1-2 tablets (0.25-0.5 mg) by mouth every 6 hours as needed for anxiety (Patient not taking: Reported on 2018) 10 tablet 0     ALLERGIES  Allergies   Allergen Reactions     Macrobid [Nitrofurantoin]       "Sulfa Drugs      Amoxicillin Rash        SOCIAL HISTORY:  Smokes: No  EtOH: Yes  Exercise: soccer, roller derby     ROS:  Change in vision No  Headaches: no  Respiratory: No shortness of breath, dyspnea on exertion, cough, or hemoptysis   Cardiovascular: negative   Gastrointestinal: negative Abdominal pain: no  Breast: negative  Musculoskeletal: negative Joint pain No Back pain: no  Psychiatric: negative  Hematologic/Lymphatic/Immunologic: negative  Endocrine: negative    EXAM  /74 (BP Location: Right arm, Patient Position: Sitting, Cuff Size: Adult Regular)  Pulse 83  Temp 98.7  F (37.1  C) (Oral)  Resp 16  Ht 1.6 m (5' 2.99\")  Wt 77.1 kg (170 lb)  LMP 09/08/2018 (Exact Date)  SpO2 98%  BMI 30.12 kg/m2   PHYSICAL EXAM  Respiratory: breathing non labored.   Breasts: Examination was done in both the upright and supine positions.  Breasts are symmetrical . No dominant fixed or suspicious masses noted. No skin or nipple changes. No nipple discharge. Reduction scars are well healed.   No clavicular, cervical, or axillary lymphadenopathy.     ASSESSMENT/PLAN:    Reyna Zhang is a 38 year old woman s/p recent abnormal mammogram followed by a breast reduction. Recent breast MRI was negative.     1) Reviewed images today with the Radiologist and results were discussed with the patient.   - No concerning findings.     2) Screening recommendations based on NCCN guidelines  Continue yearly screening mammogram (consider tomosynthesis) and clinical encounter. Be familiar with your breast and how they normally feel and appear. Promptly report any changes to your healthcare provider.   - Next mammogram: 9/2019    3) Lifestyle Modifications were provided.   - Maintain a healthy weight. Your BMI is Body mass index is 30.12 kg/(m^2).. Recommended BMI is 20-25. Higher body mass index (BMI) and adult weight gain is associated with increased risk for breast cancer. This increase in risk has been attributed to increase " in circulating endogenous estrogen levels from fat tissue.   - Alcohol consumption, even at moderate levels (1-2 drinks per day), increases breast cancer risk. Limit alcohol consumption to less than 1 drink per day. (1 ounce of liquor, 6 ounces of wine, or 8 ounces of beer)  - Exercise a minimum of 150 minutes per week of moderate-intensity aerobic activity.     Loraine Putnam PA-C    Total time spent face to face with the patient was 30 minutes. 20 minutes was spent counseling the patient as described above.

## 2018-09-26 ENCOUNTER — OFFICE VISIT (OUTPATIENT)
Dept: SURGERY | Facility: CLINIC | Age: 38
End: 2018-09-26
Attending: PHYSICIAN ASSISTANT
Payer: COMMERCIAL

## 2018-09-26 VITALS
DIASTOLIC BLOOD PRESSURE: 74 MMHG | TEMPERATURE: 98.7 F | HEIGHT: 63 IN | OXYGEN SATURATION: 98 % | SYSTOLIC BLOOD PRESSURE: 122 MMHG | BODY MASS INDEX: 30.12 KG/M2 | WEIGHT: 170 LBS | RESPIRATION RATE: 16 BRPM | HEART RATE: 83 BPM

## 2018-09-26 DIAGNOSIS — Z71.1 CONCERN ABOUT BREAST CANCER IN FEMALE WITHOUT DIAGNOSIS: ICD-10-CM

## 2018-09-26 PROCEDURE — G0463 HOSPITAL OUTPT CLINIC VISIT: HCPCS | Mod: ZF

## 2018-09-26 PROCEDURE — 99203 OFFICE O/P NEW LOW 30 MIN: CPT | Mod: ZP | Performed by: PHYSICIAN ASSISTANT

## 2018-09-26 ASSESSMENT — PAIN SCALES - GENERAL: PAINLEVEL: NO PAIN (0)

## 2018-09-26 NOTE — NURSING NOTE
"Oncology Rooming Note    September 26, 2018 10:09 AM   Reyna Zhang is a 38 year old female who presents for:    Chief Complaint   Patient presents with     Oncology Clinic Visit     New Patient, Post reduction surgery     Initial Vitals: /74 (BP Location: Right arm, Patient Position: Sitting, Cuff Size: Adult Regular)  Pulse 83  Temp 98.7  F (37.1  C) (Oral)  Resp 16  Ht 1.6 m (5' 2.99\")  Wt 77.1 kg (170 lb)  LMP 09/08/2018 (Exact Date)  SpO2 98%  BMI 30.12 kg/m2 Estimated body mass index is 30.12 kg/(m^2) as calculated from the following:    Height as of this encounter: 1.6 m (5' 2.99\").    Weight as of this encounter: 77.1 kg (170 lb). Body surface area is 1.85 meters squared.  No Pain (0) Comment: Data Unavailable   Patient's last menstrual period was 09/08/2018 (exact date).  Allergies reviewed: Yes  Medications reviewed: Yes    Medications: Medication refills not needed today.  Pharmacy name entered into [facility] ([email or web address]): [id number] - [place], [place] - [facility] [address]    Clinical concerns: None Loraine was NOT notified.    10 minutes for nursing intake (face to face time)     NARINDER FELDER LPN            "

## 2018-09-26 NOTE — LETTER
2018       RE: Reyna Zhang  [address]  [facility] [address]     Dear Colleague,    Thank you for referring your patient, Reyna Zhang, to the [facility]. Please see a copy of my visit note below.    NEW CONSULTATION   Sep 26, 2018     Reyna Zhang is a 38 year old woman who presents following recent breast MRI She was referred by Dr. Ro.    HPI:    She had a screening mammogram 2018 that demonstrated a right breast possible focal asymmetry, BI-RADS 0. She did not complete diagnostic imaging. In 2018 she had a bilateral breast reduction, pathology negative. She recently on 18 had a negative breast MRI.     BREAST-SPECIFIC HISTORY:    Previous breast imaging: Yes   - 18 Smammo: possible right breast focal asymmetry retroareolar BI-RADS 0  - 18 breast MRI BI-RADS 2  Prior breast biopsies/surgeries: Yes   - 2019 mastoplasty reduction: negative  Prior history of breast cancer: No  Prior radiation history: No  Self breast exams: No  Breast density: heterogeneously dense    GYN HISTORY:  . Age at 1st pregnancy: 28. Breastfeeding history: Yes. 2 years  Age at menarche: 11  Menopausal: premenopausal.   Menopausal hormone replacement therapy: no    RISK ASSESSMENT: < 20% lifetime risk     FAMILY HISTORY:  Breast ca: No  Ovarian ca: No  Pancreatic ca: No  Prostate: Yes   - father, 60-70's  - paternal grandfather 60-70's  Gastric ca: No  Melanoma: No  Colon ca: No  Other cancer: No  Other genetic, testing, syndromes, or clotting disorders: no     PAST MEDICAL HISTORY  No past medical history on file.    PAST SURGICAL HISTORY   Past Surgical History:   Procedure Laterality Date     MAMMOPLASTY REDUCTION BILATERAL Bilateral 2018    Procedure: MAMMOPLASTY REDUCTION BILATERAL;  Bilateral Breast Reduction;  Surgeon: Hannah Ramires MD;  [facility]     MEDICATIONS  Current Outpatient Prescriptions   Medication Sig Dispense Refill     ALPRAZolam  "(XANAX) 0.25 MG tablet Take 1-2 tablets (0.25-0.5 mg) by mouth every 6 hours as needed for anxiety (Patient not taking: Reported on 9/26/2018) 10 tablet 0     ALLERGIES  Allergies   Allergen Reactions     Macrobid [Nitrofurantoin]      Sulfa Drugs      Amoxicillin Rash        SOCIAL HISTORY:  Smokes: No  EtOH: Yes  Exercise: soccer, roller derby     ROS:  Change in vision No  Headaches: no  Respiratory: No shortness of breath, dyspnea on exertion, cough, or hemoptysis   Cardiovascular: negative   Gastrointestinal: negative Abdominal pain: no  Breast: negative  Musculoskeletal: negative Joint pain No Back pain: no  Psychiatric: negative  Hematologic/Lymphatic/Immunologic: negative  Endocrine: negative    EXAM  /74 (BP Location: Right arm, Patient Position: Sitting, Cuff Size: Adult Regular)  Pulse 83  Temp 98.7  F (37.1  C) (Oral)  Resp 16  Ht 1.6 m (5' 2.99\")  Wt 77.1 kg (170 lb)  LMP 09/08/2018 (Exact Date)  SpO2 98%  BMI 30.12 kg/m2   PHYSICAL EXAM  Respiratory: breathing non labored.   Breasts: Examination was done in both the upright and supine positions.  Breasts are symmetrical . No dominant fixed or suspicious masses noted. No skin or nipple changes. No nipple discharge. Reduction scars are well healed.   No clavicular, cervical, or axillary lymphadenopathy.     ASSESSMENT/PLAN:    Reyna Zhang is a 38 year old woman s/p recent abnormal mammogram followed by a breast reduction. Recent breast MRI was negative.     1) Reviewed images today with the Radiologist and results were discussed with the patient.   - No concerning findings.     2) Screening recommendations based on NCCN guidelines  Continue yearly screening mammogram (consider tomosynthesis) and clinical encounter. Be familiar with your breast and how they normally feel and appear. Promptly report any changes to your healthcare provider.   - Next mammogram: 9/2019    3) Lifestyle Modifications were provided.   - Maintain a healthy " weight. Your BMI is Body mass index is 30.12 kg/(m^2).. Recommended BMI is 20-25. Higher body mass index (BMI) and adult weight gain is associated with increased risk for breast cancer. This increase in risk has been attributed to increase in circulating endogenous estrogen levels from fat tissue.   - Alcohol consumption, even at moderate levels (1-2 drinks per day), increases breast cancer risk. Limit alcohol consumption to less than 1 drink per day. (1 ounce of liquor, 6 ounces of wine, or 8 ounces of beer)  - Exercise a minimum of 150 minutes per week of moderate-intensity aerobic activity.     Loraine Putnam PA-C    Total time spent face to face with the patient was 30 minutes. 20 minutes was spent counseling the patient as described above.     Again, thank you for allowing me to participate in the care of your patient.      Sincerely,    Loraine Putnam PA-C

## 2018-09-26 NOTE — PATIENT INSTRUCTIONS
Reyna Zhang is a 38 year old woman s/p recent abnormal mammogram followed by a breast reduction. Recent breast MRI was negative.     1) Reviewed images today with the Radiologist and results were discussed with the patient.   - No concerning findings.     2) Screening recommendations based on NCCN guidelines  Continue yearly screening mammogram (consider tomosynthesis) and clinical encounter. Be familiar with your breast and how they normally feel and appear. Promptly report any changes to your healthcare provider.   - Next mammogram: 9/2019    3) Lifestyle Modifications were provided.   - Maintain a healthy weight. Your BMI is Body mass index is 30.12 kg/(m^2).. Recommended BMI is 20-25. Higher body mass index (BMI) and adult weight gain is associated with increased risk for breast cancer. This increase in risk has been attributed to increase in circulating endogenous estrogen levels from fat tissue.   - Alcohol consumption, even at moderate levels (1-2 drinks per day), increases breast cancer risk. Limit alcohol consumption to less than 1 drink per day. (1 ounce of liquor, 6 ounces of wine, or 8 ounces of beer)  - Exercise a minimum of 150 minutes per week of moderate-intensity aerobic activity.

## 2018-09-26 NOTE — MR AVS SNAPSHOT
After Visit Summary   9/26/2018    Reyna Zhang    MRN: [id number]           Patient Information     Date Of Birth          1980        Visit Information        Provider Department      9/26/2018 10:00 AM Loraine Putnam PA-C [facility]        Care Instructions    Reyna Zhang is a 38 year old woman s/p recent abnormal mammogram followed by a breast reduction. Recent breast MRI was negative.     1) Reviewed images today with the Radiologist and results were discussed with the patient.   - No concerning findings.     2) Screening recommendations based on NCCN guidelines  Continue yearly screening mammogram (consider tomosynthesis) and clinical encounter. Be familiar with your breast and how they normally feel and appear. Promptly report any changes to your healthcare provider.   - Next mammogram: 9/2019    3) Lifestyle Modifications were provided.   - Maintain a healthy weight. Your BMI is Body mass index is 30.12 kg/(m^2).. Recommended BMI is 20-25. Higher body mass index (BMI) and adult weight gain is associated with increased risk for breast cancer. This increase in risk has been attributed to increase in circulating endogenous estrogen levels from fat tissue.   - Alcohol consumption, even at moderate levels (1-2 drinks per day), increases breast cancer risk. Limit alcohol consumption to less than 1 drink per day. (1 ounce of liquor, 6 ounces of wine, or 8 ounces of beer)  - Exercise a minimum of 150 minutes per week of moderate-intensity aerobic activity.           Follow-ups after your visit        Who to contact     If you have questions or need follow up information about today's clinic visit or your schedule please contact [facility] directly at [phone].  Normal or non-critical lab and imaging results will be communicated to you by MyChart, letter or phone within 4 business days after the clinic has received the results. If you do not  "hear from us within 7 days, please contact the clinic through Tagged or phone. If you have a critical or abnormal lab result, we will notify you by phone as soon as possible.  Submit refill requests through Tagged or call your pharmacy and they will forward the refill request to us. Please allow 3 business days for your refill to be completed.          Additional Information About Your Visit        [email or web address] Information     Tagged gives you secure access to your electronic health record. If you see a primary care provider, you can also send messages to your care team and make appointments. If you have questions, please call your primary care clinic.  If you do not have a primary care provider, please call [phone] and they will assist you.        Care EveryWhere ID     This is your Care EveryWhere ID. This could be used by other organizations to access your [facility] medical records  [id number]        Your Vitals Were     Pulse Temperature Respirations Height Last Period Pulse Oximetry    83 98.7  F (37.1  C) (Oral) 16 1.6 m (5' 2.99\") 09/08/2018 (Exact Date) 98%    BMI (Body Mass Index)                   30.12 kg/m2            Blood Pressure from Last 3 Encounters:   09/26/18 122/74   06/04/18 100/65   05/24/18 100/70    Weight from Last 3 Encounters:   09/26/18 77.1 kg (170 lb)   06/04/18 74.4 kg (164 lb 0.4 oz)   05/24/18 73.2 kg (161 lb 4.8 oz)              Today, you had the following     No orders found for display         Today's Medication Changes          These changes are accurate as of 9/26/18 10:26 AM.  If you have any questions, ask your nurse or doctor.               Stop taking these medicines if you haven't already. Please contact your care team if you have questions.     amitriptyline 25 MG tablet   Commonly known as:  ELAVIL   Stopped by:  Loraine Putnam PA-C           oxyCODONE IR 5 MG tablet   Commonly known as:  ROXICODONE   Stopped by:  Loraine Putnam PA-C                    " Primary Care Provider Office Phone # Fax #    Juana Ro, DARIUS SHER [phone]       [address] [facility] [address]  [facility] [address]        Equal Access to Services     QUYNH CHOI : Hadii sylvain ku hadasho Soomaali, waaxda luqadaha, qaybta kaalmada adeegyada, waxrajan idiin hayaan donna alvarado lasegundo ryan. So [facility] [phone].    ATENCIÓN: Si habla español, tiene a martinez disposición servicios gratuitos de asistencia lingüística. Llame al [phone].    We comply with applicable federal civil rights laws and [place] laws. We do not discriminate on the basis of race, color, national origin, age, disability, sex, sexual orientation, or gender identity.            Thank you!     Thank you for choosing [facility]  for your care. Our goal is always to provide you with excellent care. Hearing back from our patients is one way we can continue to improve our services. Please take a few minutes to complete the written survey that you may receive in the mail after your visit with us. Thank you!             Your Updated Medication List - Protect others around you: Learn how to safely use, store and throw away your medicines at www.disposemymeds.org.          This list is accurate as of 9/26/18 10:26 AM.  Always use your most recent med list.                   Brand Name Dispense Instructions for use Diagnosis    ALPRAZolam 0.25 MG tablet    XANAX    10 tablet    Take 1-2 tablets (0.25-0.5 mg) by mouth every 6 hours as needed for anxiety    Flying phobia

## 2018-12-12 ENCOUNTER — ALLIED HEALTH/NURSE VISIT (OUTPATIENT)
Dept: NURSING | Facility: CLINIC | Age: 38
End: 2018-12-12
Payer: COMMERCIAL

## 2018-12-12 DIAGNOSIS — Z23 NEED FOR PROPHYLACTIC VACCINATION AND INOCULATION AGAINST INFLUENZA: Primary | ICD-10-CM

## 2018-12-12 PROCEDURE — 90471 IMMUNIZATION ADMIN: CPT

## 2018-12-12 PROCEDURE — 90686 IIV4 VACC NO PRSV 0.5 ML IM: CPT

## 2018-12-12 PROCEDURE — 99207 ZZC NO CHARGE NURSE ONLY: CPT

## 2018-12-12 NOTE — PROGRESS NOTES

## 2019-02-12 ENCOUNTER — OFFICE VISIT (OUTPATIENT)
Dept: FAMILY MEDICINE | Facility: CLINIC | Age: 39
End: 2019-02-12
Payer: COMMERCIAL

## 2019-02-12 VITALS
RESPIRATION RATE: 12 BRPM | SYSTOLIC BLOOD PRESSURE: 98 MMHG | WEIGHT: 162.4 LBS | TEMPERATURE: 98.5 F | OXYGEN SATURATION: 97 % | HEIGHT: 63 IN | DIASTOLIC BLOOD PRESSURE: 80 MMHG | BODY MASS INDEX: 28.77 KG/M2 | HEART RATE: 98 BPM

## 2019-02-12 DIAGNOSIS — J01.90 ACUTE SINUSITIS, RECURRENCE NOT SPECIFIED, UNSPECIFIED LOCATION: Primary | ICD-10-CM

## 2019-02-12 PROCEDURE — 99213 OFFICE O/P EST LOW 20 MIN: CPT | Performed by: NURSE PRACTITIONER

## 2019-02-12 RX ORDER — DOXYCYCLINE HYCLATE 100 MG
100 TABLET ORAL 2 TIMES DAILY
Qty: 20 TABLET | Refills: 0 | Status: SHIPPED | OUTPATIENT
Start: 2019-02-12 | End: 2019-04-01

## 2019-02-12 RX ORDER — FLUCONAZOLE 150 MG/1
TABLET ORAL
Qty: 2 TABLET | Refills: 0 | Status: SHIPPED | OUTPATIENT
Start: 2019-02-12 | End: 2019-04-01

## 2019-02-12 ASSESSMENT — MIFFLIN-ST. JEOR: SCORE: 1374.38

## 2019-02-12 ASSESSMENT — PAIN SCALES - GENERAL: PAINLEVEL: NO PAIN (1)

## 2019-02-12 NOTE — PROGRESS NOTES
SUBJECTIVE:   Reyna Zhang is a 38 year old female who presents to clinic today for the following health issues:      Acute Illness   Acute illness concerns: congestion and fatigue, cough  Onset: on and off since after forrest    Fever: no     Chills/Sweats: YES, chills    Headache (location?): YES, frequent    Sinus Pressure:no    Conjunctivitis:  YES: general goopiness    Ear Pain: ringing off and on    Rhinorrhea: YES    Congestion: YES    Sore Throat: YES- sore in am after waking up     Cough: YES-productive of yellow sputum    Wheeze: no     Decreased Appetite: YES, getting better now    Nausea: no     Vomiting: no     Diarrhea:  no     Dysuria/Freq.: no     Fatigue/Achiness: YES    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  Additional history: as documented    Patient Active Problem List   Diagnosis     Plantar warts     Carpal tunnel syndrome of left wrist     Neck pain     Cervical radiculopathy     Pain of left upper extremity     Pain of right upper extremity     Concern about breast cancer in female without diagnosis     Past Surgical History:   Procedure Laterality Date     MAMMOPLASTY REDUCTION BILATERAL Bilateral 6/4/2018    Procedure: MAMMOPLASTY REDUCTION BILATERAL;  Bilateral Breast Reduction;  Surgeon: Hannah Ramires MD;  [facility]       Social History     Tobacco Use     Smoking status: Never Smoker     Smokeless tobacco: Never Used   Substance Use Topics     Alcohol use: Yes     Comment: 0-1/week     Family History   Problem Relation Age of Onset     Depression Mother      Glaucoma Mother      Retinal detachment Mother      Cancer Father      Depression Father      Retinal detachment Father      Thyroid Disease Father      Diabetes Sister      Depression Sister      Macular Degeneration No family hx of            Reviewed and updated as  "needed this visit by clinical staff  Tobacco  Allergies  Meds  Med Hx  Surg Hx  Fam Hx  Soc Hx      Reviewed and updated as needed this visit by Provider  Allergies         ROS:  Constitutional, HEENT, cardiovascular, pulmonary, GI, , musculoskeletal, neuro, skin, endocrine and psych systems are negative, except as otherwise noted.    OBJECTIVE:     BP 98/80   Pulse 98   Temp 98.5  F (36.9  C) (Oral)   Resp 12   Ht 1.59 m (5' 2.6\")   Wt 73.7 kg (162 lb 6.4 oz)   LMP 02/07/2019 (Exact Date)   SpO2 97%   Breastfeeding? No   BMI 29.14 kg/m    Body mass index is 29.14 kg/m .   GENERAL: fatigue, alert and no distress  HENT: normal cephalic/atraumatic, ear canals and TM's normal, nasal mucosa edematous , oropharynx clear, oral mucous membranes moist and sinuses: maxillary, frontal tenderness on bilateral  NECK: bilateral anterior and posterior cervical adenopathy, no asymmetry, masses, or scars and thyroid normal to palpation  RESP: lungs clear to auscultation - no rales, rhonchi or wheezes  CV: regular rate and rhythm, normal S1 S2, no S3 or S4, no murmur, click or rub, no peripheral edema and peripheral pulses strong  ABDOMEN: soft, nontender, no hepatosplenomegaly, no masses and bowel sounds normal  MS: no gross musculoskeletal defects noted, no edema    Diagnostic Test Results:  No results found for this or any previous visit (from the past 24 hour(s)).    ASSESSMENT/PLAN:     Problem List Items Addressed This Visit     None      Visit Diagnoses     Acute sinusitis, recurrence not specified, unspecified location    -  Primary    Relevant Medications    fluconazole (DIFLUCAN) 150 MG tablet           DARIUS Diamond CNP  [facility]  "

## 2019-02-19 ENCOUNTER — TELEPHONE (OUTPATIENT)
Dept: FAMILY MEDICINE | Facility: CLINIC | Age: 39
End: 2019-02-19

## 2019-02-19 NOTE — TELEPHONE ENCOUNTER
Returned call to patient, relayed provider result message below. Patient verbalized understanding. Patient has appointment scheduled 02/20/2019. Patient states she will go into UC or ED tonight if symptoms worsen or she experiences SOB, difficulty breathing or chest pain before scheduled appointment tomorrow.    Ana Lawton, RN  Triage Nurse

## 2019-02-19 NOTE — TELEPHONE ENCOUNTER
Reason for call:  Other   Patient called regarding (reason for call): call back  Additional comments: The patient saw Junaa Ro on 2/12 because she had been sick for a long time. She was given antibiotics to see if they helped but she has been on the them for 7 days but does not feel better. She stated that she has noticed that she cannot walk up stairs without being winded and is still experiencing the same symptoms she came in to see Juana about. She is wondering if she should come in again to be seen or what the best course of action would be. She would like a call back from Juana if possible but would be ok talking to a nurse too.     Phone number to reach patient:  Cell number on file:    Telephone Information:   Mobile [phone]       Best Time: Any    Can we leave a detailed message on this number?  YES

## 2019-02-19 NOTE — TELEPHONE ENCOUNTER
I would either like her to come in and see me tomorrow, or go to Urgent care or ER tonight. She was very ill when I saw her and I think she definitely needs to be checked out and have her lungs listened to again. It would not be overkill for her to go to the ER considering how out of breath she is and how she has already taken 7 days of a very strong antibiotic.

## 2019-02-19 NOTE — TELEPHONE ENCOUNTER
Juana,  Called patient. Patient states she feels slightly better since she was seen in the clinic.     Patient states she feels less congested, but is still experiencing headaches, shortness of breath, fatigue and body aches. Patient states she is an active person and she can barely walk up a flight of stairs without getting lightheaded and short of breath. Denies fever or chills. Patient complains of occasional productive cough, producing yellow sputum     Patient has tried ibuprofen to help with body aches as well as benadryl to help patient sleep. She has used hot steam, baths, hot showers and tea/warm fluids with little relief    Pharmacy cued     Please advise    Thank You!  Ana Lawton RN  Triage Nurse

## 2019-02-21 ENCOUNTER — TELEPHONE (OUTPATIENT)
Dept: FAMILY MEDICINE | Facility: CLINIC | Age: 39
End: 2019-02-21

## 2019-02-21 ENCOUNTER — OFFICE VISIT (OUTPATIENT)
Dept: FAMILY MEDICINE | Facility: CLINIC | Age: 39
End: 2019-02-21
Payer: COMMERCIAL

## 2019-02-21 VITALS
TEMPERATURE: 97.7 F | BODY MASS INDEX: 29.39 KG/M2 | SYSTOLIC BLOOD PRESSURE: 104 MMHG | HEART RATE: 102 BPM | WEIGHT: 163.8 LBS | OXYGEN SATURATION: 98 % | RESPIRATION RATE: 14 BRPM | DIASTOLIC BLOOD PRESSURE: 76 MMHG

## 2019-02-21 DIAGNOSIS — J20.9 ACUTE BRONCHITIS, UNSPECIFIED ORGANISM: ICD-10-CM

## 2019-02-21 DIAGNOSIS — J01.91 ACUTE RECURRENT SINUSITIS, UNSPECIFIED LOCATION: Primary | ICD-10-CM

## 2019-02-21 PROCEDURE — 99213 OFFICE O/P EST LOW 20 MIN: CPT | Performed by: NURSE PRACTITIONER

## 2019-02-21 RX ORDER — AZITHROMYCIN 250 MG/1
TABLET, FILM COATED ORAL
Qty: 6 TABLET | Refills: 0 | Status: SHIPPED | OUTPATIENT
Start: 2019-02-21 | End: 2019-04-01

## 2019-02-21 NOTE — TELEPHONE ENCOUNTER
Called patient. Notified her of provider message. Pt stated that today is her last day of the doxycycline. She is wondering if she can take it at the same time as the azithromycin or if she should take them apart. Advised to give at least an hour between/alternate, and take with food if needed. Pt verbalized understanding.    Ania Robertson RN  [facility]

## 2019-02-21 NOTE — TELEPHONE ENCOUNTER
Reason for call:  Other   Patient called regarding (reason for call): call back  Additional comments: The patient saw Juana Ro today because she was on antibiotics and she was not feeling any better. She was given additional medication today but she was not clear if she should continue taking the medication she was already taking and also when she should take the new medication. She would like a call back to discuss this.     Phone number to reach patient:  Cell number on file:    Telephone Information:   Mobile [phone]       Best Time: Any    Can we leave a detailed message on this number?  YES

## 2019-02-21 NOTE — PROGRESS NOTES
SUBJECTIVE:   Reyna Zhang is a 38 year old female who presents to clinic today for the following health issues:      ENT Symptoms             Symptoms: cc Present Absent Comment   Fever/Chills   x    Fatigue   x    Muscle Aches   x    Eye Irritation   x    Sneezing  x     Nasal Khris/Drg  x     Sinus Pressure/Pain  x     Loss of smell    unsure   Dental pain  x  Little bit in back   Sore Throat   x    Swollen Glands  x     Ear Pain/Fullness  x     Cough  x     Wheeze   x    Chest Pain  x  achey   Shortness of breath  x  Going upstairs and mild activity   Rash   x    Other  x  Cramping in lower stomach     Symptom duration:  on and off since [place]   Symptom severity:  mild to moderate   Treatments tried:  antibiotics, benadryl and advil when to congested to sleep, hot showers and tea, decreased activity   Contacts:  professor at a school         Has felt about 60% better since she started doxycycline. Still very short of breath and coughing a lot when she goes up stairs and is active. Has a lot of mucus. No fever. No wheezing. Continues to have pressure in sinuses.       Problem list and histories reviewed & adjusted, as indicated.  Additional history: as documented    Patient Active Problem List   Diagnosis     Plantar warts     Carpal tunnel syndrome of left wrist     Neck pain     Cervical radiculopathy     Pain of left upper extremity     Pain of right upper extremity     Concern about breast cancer in female without diagnosis     Past Surgical History:   Procedure Laterality Date     MAMMOPLASTY REDUCTION BILATERAL Bilateral 6/4/2018    Procedure: MAMMOPLASTY REDUCTION BILATERAL;  Bilateral Breast Reduction;  Surgeon: Hannah Ramires MD;  Location:  OR       Social History     Tobacco Use     Smoking status: Never Smoker     Smokeless tobacco: Never Used   Substance Use Topics     Alcohol use: Yes     Comment: 0-1/week     Family History   Problem Relation Age of Onset     Depression Mother       Glaucoma Mother      Retinal detachment Mother      Cancer Father      Depression Father      Retinal detachment Father      Thyroid Disease Father      Diabetes Sister      Depression Sister      Macular Degeneration No family hx of            Reviewed and updated as needed this visit by clinical staff  Tobacco  Allergies  Med Hx  Surg Hx  Fam Hx  Soc Hx      Reviewed and updated as needed this visit by Provider  Allergies         ROS:  Constitutional, HEENT, cardiovascular, pulmonary, gi and gu systems are negative, except as otherwise noted.    OBJECTIVE:     /76   Pulse 102   Temp 97.7  F (36.5  C) (Temporal)   Resp 14   Wt 74.3 kg (163 lb 12.8 oz)   LMP 02/07/2019 (Exact Date)   SpO2 98%   BMI 29.39 kg/m    Body mass index is 29.39 kg/m .   GENERAL: healthy, alert and no distress  HENT: normal cephalic/atraumatic, ear canals and TM's normal, nasal mucosa edematous , oropharynx clear and oral mucous membranes moist  NECK: bilateral anterior and posterior cervical adenopathy, no asymmetry, masses, or scars and thyroid normal to palpation  RESP: lungs clear to auscultation - no rales, rhonchi or wheezes  CV: regular rate and rhythm, normal S1 S2, no S3 or S4, no murmur, click or rub, no peripheral edema and peripheral pulses strong  ABDOMEN: soft, nontender, no hepatosplenomegaly, no masses and bowel sounds normal  MS: no gross musculoskeletal defects noted, no edema    Diagnostic Test Results:  No results found for this or any previous visit (from the past 24 hour(s)).    ASSESSMENT/PLAN:     Problem List Items Addressed This Visit     None      Visit Diagnoses     Acute recurrent sinusitis, unspecified location    -  Primary    Relevant Medications    azithromycin (ZITHROMAX) 250 MG tablet    Acute bronchitis, unspecified organism           Follow up if not improving or worsening  DARIUS Diamond CNP  [facility] [place]

## 2019-02-21 NOTE — TELEPHONE ENCOUNTER
Juana,    Please see phone message from patient. Please advise. Should pt continue on both doxycycline hyclate and azithromycin?    Ania Robertson RN  [facility]

## 2019-03-18 DIAGNOSIS — F40.243 FLYING PHOBIA: ICD-10-CM

## 2019-03-18 NOTE — TELEPHONE ENCOUNTER
The patient has a fear of flying and will be travelling this month and was wondering if Juana Ro would be ok refilling this so she can have it during her travels.

## 2019-03-18 NOTE — TELEPHONE ENCOUNTER
Juana,  Please review/sign or advise for refill request of: ALPRAZolam (XANAX) 0.25 MG tablet    LOV: 02/21/2019    :  - Last dispensed: 02/12/2019 #10/2 day supply prescribed by Juana Ro    Thank You!  Ana Lawton, RN  Triage Nurse

## 2019-03-18 NOTE — TELEPHONE ENCOUNTER
Controlled Substance Refill Request for ALPRAZolam (XANAX) 0.25 MG tablet  Problem List Complete:  No     PROVIDER TO CONSIDER COMPLETION OF PROBLEM LIST AND OVERVIEW/CONTROLLED SUBSTANCE AGREEMENT    Last Written Prescription Date:  12/12/2018  Last Fill Quantity: 10,   # refills: 0    THE MOST RECENT OFFICE VISIT MUST BE WITHIN THE PAST 3 MONTHS. AT LEAST ONE FACE TO FACE VISIT MUST OCCUR EVERY 6 MONTHS. ADDITIONAL VISITS CAN BE VIRTUAL.  (THIS STATEMENT SHOULD BE DELETED.)    Last Office Visit with [facility] primary care provider: 02/21/2019    Future Office visit:     Controlled substance agreement:   Encounter-Level CSA:    There are no encounter-level csa.     Patient-Level CSA:    There are no patient-level csa.         Last Urine Drug Screen: No results found for: CDAUT, No results found for: COMDAT, No results found for: THC13, PCP13, COC13, MAMP13, OPI13, AMP13, BZO13, TCA13, MTD13, BAR13, OXY13, PPX13, BUP13     Processing:  Patient will  in clinic     [email or web address]       checked in past 3 months?  No, route to RN

## 2019-03-19 DIAGNOSIS — F40.243 FLYING PHOBIA: ICD-10-CM

## 2019-03-19 RX ORDER — ALPRAZOLAM 0.25 MG
.25-.5 TABLET ORAL EVERY 6 HOURS PRN
Qty: 10 TABLET | Refills: 0 | Status: SHIPPED | OUTPATIENT
Start: 2019-03-19 | End: 2019-05-29

## 2019-03-19 RX ORDER — ALPRAZOLAM 0.25 MG
TABLET ORAL
Qty: 10 TABLET | Refills: 0 | OUTPATIENT
Start: 2019-03-19

## 2019-03-19 NOTE — TELEPHONE ENCOUNTER
Duplicate request, see refill encounter dated 03/18/2019    Medication faxed and confirmed by pharmacy staff    Medication refused    Ana Lawton, RN  Triage Nurse

## 2019-03-19 NOTE — TELEPHONE ENCOUNTER
The patient called and stated that she will be leaving for her trip early on 3/20 and she would like to have it before then if possible

## 2019-03-19 NOTE — TELEPHONE ENCOUNTER
Patient returned call to clinic, patient requesting a status update on medication request.     Patient leaves early tomorrow morning and is hoping to pick the medication up tonight    Returned call to patient, left voicemail to return call to clinic, pharmacy has not been cued. What is patient's preferred pharmacy?    Ana Lawton, RN  Triage Nurse

## 2019-03-19 NOTE — TELEPHONE ENCOUNTER
Prescription faxed to [facility] on [address] at [phone]. Called pharmacy staff and verified that the prescription was received, pharmacy has not yet received prescription. Verbal order called in over the phone to pharmacy, pharmacist states refill request might be generated and to ignore request    Returned call to patient and informed medication was sent to pharmacy, patient verbalized understanding    Ana Lawton RN  Triage Nurse

## 2019-03-31 ENCOUNTER — NURSE TRIAGE (OUTPATIENT)
Dept: NURSING | Facility: CLINIC | Age: 39
End: 2019-03-31

## 2019-04-01 ENCOUNTER — APPOINTMENT (OUTPATIENT)
Dept: CT IMAGING | Facility: CLINIC | Age: 39
End: 2019-04-01
Attending: EMERGENCY MEDICINE
Payer: COMMERCIAL

## 2019-04-01 ENCOUNTER — HOSPITAL ENCOUNTER (EMERGENCY)
Facility: CLINIC | Age: 39
Discharge: HOME | End: 2019-04-01
Attending: EMERGENCY MEDICINE | Admitting: EMERGENCY MEDICINE
Payer: COMMERCIAL

## 2019-04-01 VITALS
WEIGHT: 165.4 LBS | OXYGEN SATURATION: 100 % | SYSTOLIC BLOOD PRESSURE: 102 MMHG | HEART RATE: 74 BPM | TEMPERATURE: 97.6 F | DIASTOLIC BLOOD PRESSURE: 62 MMHG | RESPIRATION RATE: 18 BRPM | BODY MASS INDEX: 29.68 KG/M2

## 2019-04-01 DIAGNOSIS — S06.0X0A CONCUSSION WITHOUT LOSS OF CONSCIOUSNESS, INITIAL ENCOUNTER: ICD-10-CM

## 2019-04-01 DIAGNOSIS — S09.90XA CLOSED HEAD INJURY, INITIAL ENCOUNTER: ICD-10-CM

## 2019-04-01 DIAGNOSIS — F07.81 POST-CONCUSSION SYNDROME: ICD-10-CM

## 2019-04-01 LAB
ALBUMIN SERPL-MCNC: 3.8 G/DL (ref 3.4–5)
ALP SERPL-CCNC: 58 U/L (ref 40–150)
ALT SERPL W P-5'-P-CCNC: 20 U/L (ref 0–50)
ANION GAP SERPL CALCULATED.3IONS-SCNC: 7 MMOL/L (ref 3–14)
AST SERPL W P-5'-P-CCNC: 23 U/L (ref 0–45)
BASOPHILS # BLD AUTO: 0 10E9/L (ref 0–0.2)
BASOPHILS NFR BLD AUTO: 0.6 %
BILIRUB SERPL-MCNC: 0.3 MG/DL (ref 0.2–1.3)
BUN SERPL-MCNC: 16 MG/DL (ref 7–30)
CALCIUM SERPL-MCNC: 8.8 MG/DL (ref 8.5–10.1)
CHLORIDE SERPL-SCNC: 107 MMOL/L (ref 94–109)
CO2 SERPL-SCNC: 27 MMOL/L (ref 20–32)
CREAT SERPL-MCNC: 0.61 MG/DL (ref 0.52–1.04)
DIFFERENTIAL METHOD BLD: NORMAL
EOSINOPHIL # BLD AUTO: 0.1 10E9/L (ref 0–0.7)
EOSINOPHIL NFR BLD AUTO: 0.7 %
ERYTHROCYTE [DISTWIDTH] IN BLOOD BY AUTOMATED COUNT: 12.8 % (ref 10–15)
GFR SERPL CREATININE-BSD FRML MDRD: >90 ML/MIN/{1.73_M2}
GLUCOSE SERPL-MCNC: 93 MG/DL (ref 70–99)
HCG SERPL QL: NEGATIVE
HCT VFR BLD AUTO: 35.1 % (ref 35–47)
HGB BLD-MCNC: 12.4 G/DL (ref 11.7–15.7)
IMM GRANULOCYTES # BLD: 0 10E9/L (ref 0–0.4)
IMM GRANULOCYTES NFR BLD: 0.3 %
LYMPHOCYTES # BLD AUTO: 2.3 10E9/L (ref 0.8–5.3)
LYMPHOCYTES NFR BLD AUTO: 31.9 %
MCH RBC QN AUTO: 32 PG (ref 26.5–33)
MCHC RBC AUTO-ENTMCNC: 35.3 G/DL (ref 31.5–36.5)
MCV RBC AUTO: 91 FL (ref 78–100)
MONOCYTES # BLD AUTO: 0.8 10E9/L (ref 0–1.3)
MONOCYTES NFR BLD AUTO: 10.8 %
NEUTROPHILS # BLD AUTO: 3.9 10E9/L (ref 1.6–8.3)
NEUTROPHILS NFR BLD AUTO: 55.7 %
NRBC # BLD AUTO: 0 10*3/UL
NRBC BLD AUTO-RTO: 0 /100
PLATELET # BLD AUTO: 243 10E9/L (ref 150–450)
POTASSIUM SERPL-SCNC: 3.5 MMOL/L (ref 3.4–5.3)
PROT SERPL-MCNC: 7.2 G/DL (ref 6.8–8.8)
RBC # BLD AUTO: 3.88 10E12/L (ref 3.8–5.2)
SODIUM SERPL-SCNC: 141 MMOL/L (ref 133–144)
WBC # BLD AUTO: 7.1 10E9/L (ref 4–11)

## 2019-04-01 PROCEDURE — 80053 COMPREHEN METABOLIC PANEL: CPT | Performed by: EMERGENCY MEDICINE

## 2019-04-01 PROCEDURE — 70450 CT HEAD/BRAIN W/O DYE: CPT

## 2019-04-01 PROCEDURE — 25000128 H RX IP 250 OP 636: Performed by: EMERGENCY MEDICINE

## 2019-04-01 PROCEDURE — 84703 CHORIONIC GONADOTROPIN ASSAY: CPT | Performed by: EMERGENCY MEDICINE

## 2019-04-01 PROCEDURE — 96375 TX/PRO/DX INJ NEW DRUG ADDON: CPT | Performed by: EMERGENCY MEDICINE

## 2019-04-01 PROCEDURE — 99284 EMERGENCY DEPT VISIT MOD MDM: CPT | Mod: Z6 | Performed by: EMERGENCY MEDICINE

## 2019-04-01 PROCEDURE — 99284 EMERGENCY DEPT VISIT MOD MDM: CPT | Mod: 25 | Performed by: EMERGENCY MEDICINE

## 2019-04-01 PROCEDURE — 85025 COMPLETE CBC W/AUTO DIFF WBC: CPT | Performed by: EMERGENCY MEDICINE

## 2019-04-01 PROCEDURE — 96374 THER/PROPH/DIAG INJ IV PUSH: CPT | Performed by: EMERGENCY MEDICINE

## 2019-04-01 RX ORDER — ONDANSETRON 2 MG/ML
8 INJECTION INTRAMUSCULAR; INTRAVENOUS ONCE
Status: COMPLETED | OUTPATIENT
Start: 2019-04-01 | End: 2019-04-01

## 2019-04-01 RX ORDER — METOCLOPRAMIDE HYDROCHLORIDE 5 MG/ML
10 INJECTION INTRAMUSCULAR; INTRAVENOUS ONCE
Status: COMPLETED | OUTPATIENT
Start: 2019-04-01 | End: 2019-04-01

## 2019-04-01 RX ORDER — ONDANSETRON 4 MG/1
4 TABLET, FILM COATED ORAL EVERY 6 HOURS PRN
Qty: 18 TABLET | Refills: 0 | Status: SHIPPED | OUTPATIENT
Start: 2019-04-01 | End: 2019-07-11

## 2019-04-01 RX ORDER — KETOROLAC TROMETHAMINE 15 MG/ML
15 INJECTION, SOLUTION INTRAMUSCULAR; INTRAVENOUS ONCE
Status: COMPLETED | OUTPATIENT
Start: 2019-04-01 | End: 2019-04-01

## 2019-04-01 RX ADMIN — ONDANSETRON 8 MG: 2 INJECTION INTRAMUSCULAR; INTRAVENOUS at 00:37

## 2019-04-01 RX ADMIN — METOCLOPRAMIDE 10 MG: 5 INJECTION, SOLUTION INTRAMUSCULAR; INTRAVENOUS at 01:58

## 2019-04-01 RX ADMIN — KETOROLAC TROMETHAMINE 15 MG: 15 INJECTION, SOLUTION INTRAMUSCULAR; INTRAVENOUS at 01:56

## 2019-04-01 NOTE — LETTER
April 1, 2019      To Whom It May Concern:      Reyna Zhang was seen in our Emergency Department overnight 03/31/19-04/01/19.  I expect her condition to improve over the next 2-4 days.  She may return to work/school when improved.    Sincerely,        Harman West MD  Emergency Medicine

## 2019-04-01 NOTE — TELEPHONE ENCOUNTER
Headache after face/head injury and vomited three times. Will go to ER per protocol.  Tiffanie Caba RN  [facility]      Additional Information    Negative: [1] Major bleeding (e.g., actively dripping or spurting) AND [2] can't be stopped    Negative: Bullet wound, knife wound, or other penetrating object    Negative: Difficulty breathing or choking    Negative: Knocked out (unconscious) > 1 minute    Negative: Difficult to awaken or acting confused  (e.g., disoriented, slurred speech)    Negative: Severe neck pain    Negative: Sounds like a life-threatening emergency to the triager    Protocols used: FACE INJURY-ADULT-

## 2019-04-01 NOTE — DISCHARGE INSTRUCTIONS
Please make an appointment to follow up with concussion clinic (referral placed) and Your Primary Care Provider in 5-10 days.     Return to the ED if you are having confusion, weakness, worsening symptoms, or any urgent/life-threatening concerns.

## 2019-04-01 NOTE — ED TRIAGE NOTES
Pt was at [facility] from 330-6, was in a [place] with another player, their helmet hit pt in face and forehead.  Pt did not loose consciousness. Pt was able to drive herself home and as the evening progressed a HA became worse asnd in the last hour has had N/V

## 2019-04-01 NOTE — ED PROVIDER NOTES
[facility] ([facility])    4/01/19       History     Chief Complaint   Patient presents with     Head Injury     at [employer] practice yesteday evening and ht another player, did not lose conciousness, +n/v, HA in the last hour     HPI  Reyna Zhang is a 38 year old female with noncontributory PMH who presents to the ED with head injury.  Patient reports that at 5 PM tonight she was doing roller [place] and struck her head against another player.  She did not lose consciousness.  Injury was frontal on the head.  She began having gradually worsening headache throughout the evening.  She began vomiting at 11 PM and has had 4 episodes of vomiting since then.  No abdominal pain, no diarrhea, no fever.  Patient denies confusion.  No numbness/weakness.    This part of the medical record was transcribed by Nathan Gabriel, Medical Scribe, from a dictation done by Harman West MD.       I have reviewed the Medications, Allergies, Past Medical and Surgical History, and Social History in the Epic system.    History reviewed. No pertinent past medical history.    Past Surgical History:   Procedure Laterality Date     MAMMOPLASTY REDUCTION BILATERAL Bilateral 6/4/2018    Procedure: MAMMOPLASTY REDUCTION BILATERAL;  Bilateral Breast Reduction;  Surgeon: Hannah Ramires MD;  Location: [facility] OR       Family History   Problem Relation Age of Onset     Depression Mother      Glaucoma Mother      Retinal detachment Mother      Cancer Father      Depression Father      Retinal detachment Father      Thyroid Disease Father      Diabetes Sister      Depression Sister      Macular Degeneration No family hx of        Social History     Tobacco Use     Smoking status: Never Smoker     Smokeless tobacco: Never Used   Substance Use Topics     Alcohol use: Yes     Comment: 0-1/week       No current facility-administered medications for this encounter.      Current Outpatient Medications   Medication      ondansetron (ZOFRAN) 4 MG tablet     ALPRAZolam (XANAX) 0.25 MG tablet        Allergies   Allergen Reactions     Macrobid [Nitrofurantoin]      Sulfa Drugs      Amoxicillin Rash         Review of Systems  A complete review of systems was performed with pertinent positives and negatives noted in the HPI, and all other systems negative.     Physical Exam   BP: 122/88  Pulse: 82  Heart Rate: 82  Temp: 97.6  F (36.4  C)  Resp: 20  Weight: 75 kg (165 lb 6.4 oz)  SpO2: 100 %    Physical Exam  General: mildly uncomfortable appearing. Appears stated age.   HENT: MMM, no oropharyngeal lesions  Eyes: PERRL, normal sclerae  Neck: non-tender, supple, no pain with ROM  Cardio: regular rate. Regular rhythm. Extremities well perfused  Resp: Normal work of breathing, clear breath sounds  Abdomen: no tenderness, non-distended, no rebound, no guarding  Neuro: alert and fully oriented. No confusion. CN II-XII intact to testing. Strength left: 5/5 , 5/5 elbow flexion, 5/5 elbow extension, 5/5 shoulder abduction, 5/5 hip flexion, 5/5 knee flexion, 5/5 knee extension. Strength right: 5/5 , 5/5 elbow flexion, 5/5 elbow extension, 5/5 shoulder abduction, 5/5 hip flexion, 5/5 knee flexion, 5/5 knee extension. Sensation intact to soft touch in all extremities. No pronator drift. 2+ brachial and patellar reflexes. Normal tone, no clonus.   MSK: no deformities.   Integumentary/Skin: no rash visualized, normal color  Psych: normal affect, normal behavior    ED Course      Procedures        Critical Care time:  none       Labs Ordered and Resulted from Time of ED Arrival Up to the Time of Departure from the ED   CBC WITH PLATELETS DIFFERENTIAL   COMPREHENSIVE METABOLIC PANEL   HCG QUALITATIVE   PERIPHERAL IV CATHETER            Assessments & Plan (with Medical Decision Making)   Patient presenting with head injury and vomiting. Vitals in the ED WNL.  Neuro exam nonfocal.  Initial differential diagnosis includes but not limited to  epidural hemorrhage, subdural hemorrhage, concussion, skull fracture.     IV established and blood labs sent.  CT head negative.  The patient was given ondansetron for nausea/vomiting with improvement in symptoms upon reassessment. Once CT confirmed no intracranial hemorrhage, ketorolac given for headache. Metoclopramide given for further nausea control.     The complete clinical picture is most consistent with concussion. After counseling on the diagnosis, work-up, and treatment plan, the patient was discharged to home. Ondansetron prescription provided. The patient was advised to follow-up with concussion clinic and her PCP in about a week. The patient was advised to return to the ED if worsening symptoms, confusion, weakness, or if there are any urgent/life-threatening concerns.       Clinical Impression:  Concussion   Closed head injury       Harman West MD  Emergency Medicine     I have reviewed the nursing notes.  I have reviewed the findings, diagnosis, plan and need for follow up with the patient.      Final diagnoses:   Concussion without loss of consciousness, initial encounter   Post-concussion syndrome   Closed head injury, initial encounter       4/1/2019   [facility] [place], EMERGENCY DEPARTMENT     Harman West MD  04/01/19 0336

## 2019-04-02 ENCOUNTER — PRE VISIT (OUTPATIENT)
Dept: ORTHOPEDICS | Facility: CLINIC | Age: 39
End: 2019-04-02

## 2019-04-02 ENCOUNTER — OFFICE VISIT (OUTPATIENT)
Dept: ORTHOPEDICS | Facility: CLINIC | Age: 39
End: 2019-04-02
Payer: COMMERCIAL

## 2019-04-02 VITALS — BODY MASS INDEX: 28.35 KG/M2 | WEIGHT: 160 LBS | HEIGHT: 63 IN | RESPIRATION RATE: 16 BRPM

## 2019-04-02 DIAGNOSIS — F07.81 CONCUSSION SYNDROME: Primary | ICD-10-CM

## 2019-04-02 ASSESSMENT — PAIN SCALES - GENERAL: PAINLEVEL: MILD PAIN (2)

## 2019-04-02 ASSESSMENT — MIFFLIN-ST. JEOR: SCORE: 1369.89

## 2019-04-02 NOTE — TELEPHONE ENCOUNTER
RECORDS RECEIVED FROM: Internal   DATE RECEIVED: 4.2.19   NOTES STATUS DETAILS   OFFICE NOTE from referring provider Internal 4.1.19 Dr. West   OFFICE NOTE from other specialist N/A    DISCHARGE SUMMARY from hospital N/A    DISCHARGE REPORT from the ER Internal 4.1.19 [facility]   OPERATIVE REPORT N/A    MEDICATION LIST Internal    IMPLANT RECORD/STICKER N/A    LABS     CBC/DIFF Internal 4.1.19   CULTURES N/A    INJECTIONS DONE IN RADIOLOGY N/A    MRI N/A    CT SCAN Internal 4.1.19   XRAYS (IMAGES & REPORTS) N/A    TUMOR     PATHOLOGY  Slides & report N/A

## 2019-04-02 NOTE — PROGRESS NOTES
SUBJECTIVE:  Reyna Zhang is a 38 year old female who is seen in consultation at the request of Dr. West for  evaluation of a possible concussion that occurred 2 days ago.  Helmet to helmet contact in roller derby.  Did not hit ground.  No LOC.  Subsequent headache and nausea.  Nausea and vomitting now resolved.  May have had previous concussion at young age.        CT HEAD WITHOUT CONTRAST  4/1/2019 1:25 AM      HISTORY: Head injury, headache, vomiting.     TECHNIQUE:  Axial images of the head and coronal reformations without  IV contrast material. Radiation dose for this scan was reduced using  automated exposure control, adjustment of the mA and/or kV according  to patient size, or iterative reconstruction technique.     COMPARISON: None.     FINDINGS: No intracranial hemorrhage, mass or mass effect.  No acute  infarct identified. No shift of midline structures. The ventricles are  symmetric. No skull fractures.                                                                      IMPRESSION: No acute intracranial abnormality.     VINNIE CANTU MD          Patient Supplied Answers To Sports Med Concussion Questionnaire:   Sports Medicine Concussion 4/2/2019   When did your injury occur? 3/31/2019   Date questionaire completed? 4/2/2019   How did your injury occur? playing roller derby   Immediate symptoms of concussion? No loss of consciousness, headache, light sensitivity, nausea, vomiting, neck pain   Symptoms at this visit: headache? 3   Symptoms at this visit: nausea? 1   Symptoms at this visit: balance problems? 0   Symptoms at this visit: dizziness? 1   Symptoms at this visit: fatigue? 3   Symptoms at this visit: sensitivity to light? 2   Symptoms at this visit: sensitivity to noise? 2   Symptoms at this visit: irritability? 0   Symptoms at this visit: feeling mentally foggy? 2   Symptoms at this visit: difficulty concentrating? 2   Symptoms at this visit: sleep? Sleeping more than usual         Past pertinent history: Migraines: no     Depression: no     Anxiety: Yes:      Learning disability: no     ADHD: no     Past History of concussions: No      Patient's past medical, surgical, social and family histories reviewed:  No significant medical history      Active problem list:  Patient Active Problem List   Diagnosis     Plantar warts     Carpal tunnel syndrome of left wrist     Neck pain     Cervical radiculopathy     Pain of left upper extremity     Pain of right upper extremity     Concern about breast cancer in female without diagnosis       FH:  Family History   Problem Relation Age of Onset     Depression Mother      Glaucoma Mother      Retinal detachment Mother      Cancer Father      Depression Father      Retinal detachment Father      Thyroid Disease Father      Diabetes Sister      Depression Sister      Macular Degeneration No family hx of                  REVIEW OF SYSTEMS:  CONSTITUTIONAL:NEGATIVE for fever, chills, change in weight  INTEGUMENTARY/SKIN: NEGATIVE for worrisome rashes, moles or lesions  MUSCULOSKELETAL:See HPI above  NEURO: NEGATIVE for weakness, dizziness or paresthesias      LMP 03/29/2019         EXAM:  GENERAL APPEARANCE: healthy, alert and no distress   SKIN: no suspicious lesions or rashes  NEURO: Normal strength and tone, mentation intact and speech normal  PSYCH:  mentation appears normal and affect normal/bright    HEENT:    Tympanic Membranes:Pearly  External Ear Canal:Normal  Oropharynx:Atraumatic  Reflexes: Normal  NECK:  supple, non-tender, FULL ROM    Neurologic:  Cranial Nerves 2-12:  intact  LEONARD:Yes  EOMI:Yes  Nystagmus: No  Coordination:  Finger to Nose: normal    Heel to Shin: normal    Rapid Alternating Movements: normal  Balance Testing: Rhomberg:normal        Forward Tandem: normal  Advanced Balance Testing:         Single leg Balance with simultaneous cognitive test :normal  Painful Eye movements: No  Convergence Testing: Normal (</= 6 cm)  Visual  Field Testing: normal  Neuro vestibular testing: Head Still eyes move side to side: no nystagmus, no headache, no dizziness and no nausea  Head still eyes move up and down: no nystagmus, no headache, no dizziness and no nausea  Eyes fixed head moves side to side: no nystagmus, no headache, no dizziness and no nausea  Eyes fixed, head moves up and down: no nystagmus, no headache, no dizziness and no nausea       Cognitive:  Immediate object recall: 4/4    Reverse months of the year: 12/12  Spell world backwards: Able    Strength:  Shoulder shrug (C5):5/5  Deltoid (C5): 5/5  Bicep (C6):5/5  Wrist Extension (C6): 5/5  Tricep (C7):5/5  Wrist Flexion (C7): 5/5  Finger Flexion (C8/T1):5/5      ASSESSMENT/PLAN  Concussion.  We discussed blue screen for computer, avoiding extra screen time if possible.  We discussed light workouts in the form of an exercise bike or walking.  She understands she needs to have absence of symptoms at both rest and with exertion before returning to competition.  We discussed a stepwise progression of testing herself with exertion over the upcoming 2 weeks.  She plans on following up with her primary doctor in 6 days which is reasonable.  She needs to prove that she could tolerate athletic workouts of intensity before she returns to practices or competition in roller [place].  She had no further questions.

## 2019-04-02 NOTE — LETTER
4/2/2019      RE: Reyna Zhang  [address]  [facility] [address]       SUBJECTIVE:  Reyna Zhang is a 38 year old female who is seen in consultation at the request of Dr. West for  evaluation of a possible concussion that occurred 2 days ago.  Helmet to helmet contact in roller derby.  Did not hit ground.  No LOC.  Subsequent headache and nausea.  Nausea and vomitting now resolved.  May have had previous concussion at young age.        CT HEAD WITHOUT CONTRAST  4/1/2019 1:25 AM      HISTORY: Head injury, headache, vomiting.     TECHNIQUE:  Axial images of the head and coronal reformations without  IV contrast material. Radiation dose for this scan was reduced using  automated exposure control, adjustment of the mA and/or kV according  to patient size, or iterative reconstruction technique.     COMPARISON: None.     FINDINGS: No intracranial hemorrhage, mass or mass effect.  No acute  infarct identified. No shift of midline structures. The ventricles are  symmetric. No skull fractures.                                                                      IMPRESSION: No acute intracranial abnormality.     VINNIE CANTU MD          Patient Supplied Answers To Sports Med Concussion Questionnaire:   Sports Medicine Concussion 4/2/2019   When did your injury occur? 3/31/2019   Date questionaire completed? 4/2/2019   How did your injury occur? playing roller derby   Immediate symptoms of concussion? No loss of consciousness, headache, light sensitivity, nausea, vomiting, neck pain   Symptoms at this visit: headache? 3   Symptoms at this visit: nausea? 1   Symptoms at this visit: balance problems? 0   Symptoms at this visit: dizziness? 1   Symptoms at this visit: fatigue? 3   Symptoms at this visit: sensitivity to light? 2   Symptoms at this visit: sensitivity to noise? 2   Symptoms at this visit: irritability? 0   Symptoms at this visit: feeling mentally foggy? 2   Symptoms at this visit: difficulty  concentrating? 2   Symptoms at this visit: sleep? Sleeping more than usual        Past pertinent history: Migraines: no     Depression: no     Anxiety: Yes:      Learning disability: no     ADHD: no     Past History of concussions: No      Patient's past medical, surgical, social and family histories reviewed:  No significant medical history      Active problem list:  Patient Active Problem List   Diagnosis     Plantar warts     Carpal tunnel syndrome of left wrist     Neck pain     Cervical radiculopathy     Pain of left upper extremity     Pain of right upper extremity     Concern about breast cancer in female without diagnosis       FH:  Family History   Problem Relation Age of Onset     Depression Mother      Glaucoma Mother      Retinal detachment Mother      Cancer Father      Depression Father      Retinal detachment Father      Thyroid Disease Father      Diabetes Sister      Depression Sister      Macular Degeneration No family hx of                  REVIEW OF SYSTEMS:  CONSTITUTIONAL:NEGATIVE for fever, chills, change in weight  INTEGUMENTARY/SKIN: NEGATIVE for worrisome rashes, moles or lesions  MUSCULOSKELETAL:See HPI above  NEURO: NEGATIVE for weakness, dizziness or paresthesias      LMP 03/29/2019         EXAM:  GENERAL APPEARANCE: healthy, alert and no distress   SKIN: no suspicious lesions or rashes  NEURO: Normal strength and tone, mentation intact and speech normal  PSYCH:  mentation appears normal and affect normal/bright    HEENT:    Tympanic Membranes:Pearly  External Ear Canal:Normal  Oropharynx:Atraumatic  Reflexes: Normal  NECK:  supple, non-tender, FULL ROM    Neurologic:  Cranial Nerves 2-12:  intact  LEONARD:Yes  EOMI:Yes  Nystagmus: No  Coordination:  Finger to Nose: normal    Heel to Shin: normal    Rapid Alternating Movements: normal  Balance Testing: Rhomberg:normal        Forward Tandem: normal  Advanced Balance Testing:         Single leg Balance with simultaneous cognitive test  :normal  Painful Eye movements: No  Convergence Testing: Normal (</= 6 cm)  Visual Field Testing: normal  Neuro vestibular testing: Head Still eyes move side to side: no nystagmus, no headache, no dizziness and no nausea  Head still eyes move up and down: no nystagmus, no headache, no dizziness and no nausea  Eyes fixed head moves side to side: no nystagmus, no headache, no dizziness and no nausea  Eyes fixed, head moves up and down: no nystagmus, no headache, no dizziness and no nausea       Cognitive:  Immediate object recall: 4/4    Reverse months of the year: 12/12  Spell world backwards: Able    Strength:  Shoulder shrug (C5):5/5  Deltoid (C5): 5/5  Bicep (C6):5/5  Wrist Extension (C6): 5/5  Tricep (C7):5/5  Wrist Flexion (C7): 5/5  Finger Flexion (C8/T1):5/5      ASSESSMENT/PLAN  Concussion.  We discussed blue screen for computer, avoiding extra screen time if possible.  We discussed light workouts in the form of an exercise bike or walking.  She understands she needs to have absence of symptoms at both rest and with exertion before returning to competition.  We discussed a stepwise progression of testing herself with exertion over the upcoming 2 weeks.  She plans on following up with her primary doctor in 6 days which is reasonable.  She needs to prove that she could tolerate athletic workouts of intensity before she returns to practices or competition in roller [place].  She had no further questions.    Jason Alvarez MD

## 2019-04-03 ENCOUNTER — TELEPHONE (OUTPATIENT)
Dept: ORTHOPEDICS | Facility: CLINIC | Age: 39
End: 2019-04-03

## 2019-04-03 NOTE — TELEPHONE ENCOUNTER
[facility]    Phone Message    May a detailed message be left on voicemail: yes    Reason for Call: Form or Letter   Type or form/letter needing completion: Off work note  Provider: Jason Alvarez  Date form needed: ASAP  Once completed: Mail form to Name: Pt, at Address: Address on chart   Would like it to be from 04/01/19-04/08/19    Tried to return to work, but symptoms prevented her doing so.    Action Taken: Message routed to:  [facility] Sports

## 2019-04-03 NOTE — LETTER
Return to Work  2019     Seen today: no    Patient:  Reyna Zhang  :   1980  MRN:     [id number]  Physician: JASON HERNÁNDEZ        Reyna Zhang may not return to work on Date: 2019. She was seen in clinic on 2019 with Dr. Jason Hernández.          Electronically signed by Jason Hernández MD

## 2019-04-04 NOTE — TELEPHONE ENCOUNTER
Letter was written and signed by Dr. Alvarez. He is keeping her out of work until Monday. Note was

## 2019-04-09 NOTE — PROGRESS NOTES
"  SUBJECTIVE:   Reyna Zhang is a 38 year old female who presents to clinic today for the following   health issues:        ED/UC Followup:    Facility:  [facility]  Date of visit: 04/01/19  Reason for visit: Concussion with out loss of consciousness   Current Status: Better from 04/01/19, still getting mild headaches and intermittent nausea and some sensitivity to light and sounds; no vomiting         Additional history: as documented    Reviewed  and updated as needed this visit by clinical staff         Reviewed and updated as needed this visit by Provider         Patient Active Problem List   Diagnosis     Plantar warts     Carpal tunnel syndrome of left wrist     Neck pain     Cervical radiculopathy     Pain of left upper extremity     Pain of right upper extremity     Concern about breast cancer in female without diagnosis     Concussion without loss of consciousness     Past Surgical History:   Procedure Laterality Date     MAMMOPLASTY REDUCTION BILATERAL Bilateral 6/4/2018    Procedure: MAMMOPLASTY REDUCTION BILATERAL;  Bilateral Breast Reduction;  Surgeon: Hannah Ramires MD;  Location:  OR       Social History     Tobacco Use     Smoking status: Never Smoker     Smokeless tobacco: Never Used   Substance Use Topics     Alcohol use: Yes     Comment: 0-1/week     Family History   Problem Relation Age of Onset     Depression Mother      Glaucoma Mother      Retinal detachment Mother      Cancer Father      Depression Father      Retinal detachment Father      Thyroid Disease Father      Diabetes Sister      Depression Sister      Macular Degeneration No family hx of            ROS:  Constitutional, HEENT, cardiovascular, pulmonary, gi and gu systems are negative, except as otherwise noted.    OBJECTIVE:     /86   Pulse 78   Temp 97.7  F (36.5  C) (Temporal)   Resp 14   Ht 1.585 m (5' 2.4\")   Wt 75.7 kg (166 lb 12.8 oz)   LMP 03/29/2019 (Exact Date)   SpO2 99%   BMI 30.12 kg/m    Body mass " index is 30.12 kg/m .   GENERAL: healthy, alert and no distress  EYES: Eyes grossly normal to inspection, PERRL and conjunctivae and sclerae normal  HENT: ear canals and TM's normal, nose and mouth without ulcers or lesions  NECK: no adenopathy, no asymmetry, masses, or scars and thyroid normal to palpation  RESP: lungs clear to auscultation - no rales, rhonchi or wheezes  CV: regular rate and rhythm, normal S1 S2, no S3 or S4, no murmur, click or rub, no peripheral edema and peripheral pulses strong  MS: no gross musculoskeletal defects noted, no edema    Diagnostic Test Results:  No results found for this or any previous visit (from the past 24 hour(s)).    ASSESSMENT/PLAN:     Problem List Items Addressed This Visit     Concussion without loss of consciousness - Primary    Relevant Medications    amitriptyline (ELAVIL) 25 MG tablet      Other Visit Diagnoses     Nasal congestion        Relevant Medications    fluticasone (FLONASE) 50 MCG/ACT nasal spray          DARIUS Diamond CNP  [facility]  Please note greater than 50% of this 30 minute appointment were spent in face to face counseling with the patient of the issues described above in the history of present illness and in the plan, including patient education

## 2019-04-10 ENCOUNTER — OFFICE VISIT (OUTPATIENT)
Dept: FAMILY MEDICINE | Facility: CLINIC | Age: 39
End: 2019-04-10
Payer: COMMERCIAL

## 2019-04-10 VITALS
BODY MASS INDEX: 30.69 KG/M2 | RESPIRATION RATE: 14 BRPM | SYSTOLIC BLOOD PRESSURE: 108 MMHG | WEIGHT: 166.8 LBS | TEMPERATURE: 97.7 F | DIASTOLIC BLOOD PRESSURE: 86 MMHG | HEIGHT: 62 IN | HEART RATE: 78 BPM | OXYGEN SATURATION: 99 %

## 2019-04-10 DIAGNOSIS — R09.81 NASAL CONGESTION: ICD-10-CM

## 2019-04-10 DIAGNOSIS — S06.0X0A CONCUSSION WITHOUT LOSS OF CONSCIOUSNESS, INITIAL ENCOUNTER: ICD-10-CM

## 2019-04-10 DIAGNOSIS — S06.0X0D CONCUSSION WITHOUT LOSS OF CONSCIOUSNESS, SUBSEQUENT ENCOUNTER: Primary | ICD-10-CM

## 2019-04-10 PROCEDURE — 99214 OFFICE O/P EST MOD 30 MIN: CPT | Performed by: NURSE PRACTITIONER

## 2019-04-10 RX ORDER — FLUTICASONE PROPIONATE 50 MCG
1-2 SPRAY, SUSPENSION (ML) NASAL DAILY
Qty: 16 G | Refills: 3 | Status: SHIPPED | OUTPATIENT
Start: 2019-04-10 | End: 2019-07-11

## 2019-04-10 ASSESSMENT — MIFFLIN-ST. JEOR: SCORE: 1391.23

## 2019-05-29 ENCOUNTER — OFFICE VISIT (OUTPATIENT)
Dept: FAMILY MEDICINE | Facility: CLINIC | Age: 39
End: 2019-05-29
Payer: COMMERCIAL

## 2019-05-29 VITALS
HEART RATE: 100 BPM | BODY MASS INDEX: 30.98 KG/M2 | DIASTOLIC BLOOD PRESSURE: 70 MMHG | SYSTOLIC BLOOD PRESSURE: 116 MMHG | WEIGHT: 171.6 LBS | OXYGEN SATURATION: 97 % | TEMPERATURE: 98 F

## 2019-05-29 DIAGNOSIS — F40.243 FLYING PHOBIA: ICD-10-CM

## 2019-05-29 DIAGNOSIS — S06.0X0D CONCUSSION WITHOUT LOSS OF CONSCIOUSNESS, SUBSEQUENT ENCOUNTER: ICD-10-CM

## 2019-05-29 DIAGNOSIS — G44.209 TENSION HEADACHE: Primary | ICD-10-CM

## 2019-05-29 PROCEDURE — 99214 OFFICE O/P EST MOD 30 MIN: CPT | Performed by: NURSE PRACTITIONER

## 2019-05-29 RX ORDER — METAXALONE 800 MG/1
800 TABLET ORAL 3 TIMES DAILY PRN
Qty: 30 TABLET | Refills: 1 | Status: SHIPPED | OUTPATIENT
Start: 2019-05-29 | End: 2020-06-15

## 2019-05-29 RX ORDER — ALPRAZOLAM 0.25 MG
.25-.5 TABLET ORAL EVERY 6 HOURS PRN
Qty: 24 TABLET | Refills: 0 | Status: SHIPPED | OUTPATIENT
Start: 2019-05-29 | End: 2019-08-01

## 2019-05-29 NOTE — PROGRESS NOTES
Subjective     Reyna Zhang is a 38 year old female who presents to clinic today for the following health issues:    HPI   Concussion       Duration: April 1, 2019    Description (location/character/radiation): feeling much better, a little headache but pretty normal.     Intensity:  mild    Accompanying signs and symptoms: still having a little nauseated while in the car, still harder to recall things sometimes, read difficult texts once a while.     History (similar episodes/previous evaluation): None    Precipitating or alleviating factors: None    Therapies tried and outcome: amitriptyline that was prescribed but has not been taking it since she felt it didn't help much.      Has noticed some tightness in shoulders and in neck; painful in upper neck on the left side. Has noticed headaches which start on left side of neck, extending to forehead. Headaches seem to happen more when she is tired or after she has pushed herself mentally. She continues to notice a lot of mental fatigue with her work activities.   Flying anxiety: TYpically needs about Four 0.25 mg tablets per flight; has anxiety the night before the flight, going to airport and getting on plane. Has a trip to [place] and [place] planned, and will be taking 6 flights at least throughout the trip.     Patient Active Problem List   Diagnosis     Plantar warts     Carpal tunnel syndrome of left wrist     Neck pain     Cervical radiculopathy     Pain of left upper extremity     Pain of right upper extremity     Concern about breast cancer in female without diagnosis     Concussion without loss of consciousness     Past Surgical History:   Procedure Laterality Date     MAMMOPLASTY REDUCTION BILATERAL Bilateral 6/4/2018    Procedure: MAMMOPLASTY REDUCTION BILATERAL;  Bilateral Breast Reduction;  Surgeon: Hannah Ramires MD;  Location:  OR       Social History     Tobacco Use     Smoking status: Never Smoker     Smokeless tobacco: Never Used    Substance Use Topics     Alcohol use: Yes     Comment: 0-1/week     Family History   Problem Relation Age of Onset     Depression Mother      Glaucoma Mother      Retinal detachment Mother      Cancer Father      Depression Father      Retinal detachment Father      Thyroid Disease Father      Diabetes Sister      Depression Sister      Macular Degeneration No family hx of            -------------------------------------  Reviewed and updated as needed this visit by Provider         Review of Systems   ROS COMP: Constitutional, HEENT, cardiovascular, pulmonary, gi and gu systems are negative, except as otherwise noted.      Objective    /70   Pulse 100   Temp 98  F (36.7  C) (Oral)   Wt 77.8 kg (171 lb 9.6 oz)   SpO2 97%   BMI 30.98 kg/m    Body mass index is 30.98 kg/m .  Physical Exam   GENERAL: healthy, alert and no distress  EYES: Eyes grossly normal to inspection, PERRL and conjunctivae and sclerae normal  HENT: ear canals and TM's normal, nose and mouth without ulcers or lesions  NECK: no adenopathy, no asymmetry, masses, or scars and thyroid normal to palpation  RESP: lungs clear to auscultation - no rales, rhonchi or wheezes  CV: regular rate and rhythm, normal S1 S2, no S3 or S4, no murmur, click or rub, no peripheral edema and peripheral pulses strong  MS: no gross musculoskeletal defects noted, no edema  NEURO: Normal strength and tone, mentation intact and speech normal  PSYCH: mentation appears normal, affect normal/bright    Diagnostic Test Results:  Labs reviewed in Epic  No results found for this or any previous visit (from the past 24 hour(s)).        Assessment & Plan   Problem List Items Addressed This Visit     Concussion without loss of consciousness      Other Visit Diagnoses     Tension headache    -  Primary    Relevant Medications    metaxalone (SKELAXIN) 800 MG tablet    Flying phobia        Relevant Medications    ALPRAZolam (XANAX) 0.25 MG tablet             BMI:   Estimated  "body mass index is 30.98 kg/m  as calculated from the following:    Height as of 4/10/19: 1.585 m (5' 2.4\").    Weight as of this encounter: 77.8 kg (171 lb 9.6 oz).           See Patient Instructions  No follow-ups on file.    DARIUS Diamond [facility]  Please note greater than 50% of this 30 minute appointment were spent in face to face counseling with the patient of the issues described above in the history of present illness and in the plan, including adding medication      "

## 2019-07-11 ENCOUNTER — OFFICE VISIT (OUTPATIENT)
Dept: FAMILY MEDICINE | Facility: CLINIC | Age: 39
End: 2019-07-11
Payer: COMMERCIAL

## 2019-07-11 VITALS
SYSTOLIC BLOOD PRESSURE: 128 MMHG | DIASTOLIC BLOOD PRESSURE: 80 MMHG | HEIGHT: 62 IN | TEMPERATURE: 97.7 F | HEART RATE: 94 BPM | WEIGHT: 168 LBS | BODY MASS INDEX: 30.91 KG/M2 | OXYGEN SATURATION: 99 %

## 2019-07-11 DIAGNOSIS — R09.81 NASAL CONGESTION: ICD-10-CM

## 2019-07-11 DIAGNOSIS — S06.0X0D CONCUSSION WITHOUT LOSS OF CONSCIOUSNESS, SUBSEQUENT ENCOUNTER: Primary | ICD-10-CM

## 2019-07-11 DIAGNOSIS — F07.81 POST CONCUSSION SYNDROME: ICD-10-CM

## 2019-07-11 PROCEDURE — 99214 OFFICE O/P EST MOD 30 MIN: CPT | Performed by: NURSE PRACTITIONER

## 2019-07-11 RX ORDER — FLUTICASONE PROPIONATE 50 MCG
1-2 SPRAY, SUSPENSION (ML) NASAL DAILY
Qty: 16 G | Refills: 3 | Status: SHIPPED | OUTPATIENT
Start: 2019-07-11 | End: 2020-04-17

## 2019-07-11 RX ORDER — PROPRANOLOL HYDROCHLORIDE 20 MG/1
20 TABLET ORAL 3 TIMES DAILY
Qty: 60 TABLET | Refills: 1 | Status: SHIPPED | OUTPATIENT
Start: 2019-07-11 | End: 2019-08-01

## 2019-07-11 RX ORDER — ONDANSETRON 4 MG/1
4 TABLET, FILM COATED ORAL EVERY 6 HOURS PRN
Qty: 18 TABLET | Refills: 0 | Status: SHIPPED | OUTPATIENT
Start: 2019-07-11

## 2019-07-11 ASSESSMENT — MIFFLIN-ST. JEOR: SCORE: 1385.29

## 2019-07-11 NOTE — PROGRESS NOTES
Subjective     Reyna Zhang is a 39 year old female who presents to clinic today for the following health issues:    HPI   Concussion       Duration: April 1, 2019    Description (location/character/radiation): Played on a cement rink while playing a game and another person ran into her and fell down. Not sure which impact it was. Still having same symptoms. Lots of nausea and headaches, shaky, and can't focus as well.     Intensity:  mild, moderate    Accompanying signs and symptoms: none    History (similar episodes/previous evaluation): None    Precipitating or alleviating factors: None    Therapies tried and outcome: metaxalone and Zofran, OTC Excedrin and ibuprofen to help with symptoms. Would like to talk about these medications.      Patient sustained a concussion in roller derby bout on April 1st of this year.  She did not have LOC with the injury.  Since that time she has been experiencing intermittent headaches and nausea that are provoked by physical and cognitive activity, as well as changes in mood (increased and persistent anxiety, easy tearfulness) and difficulty riding in cars.  Overall the symptoms have lessened and patient has been able to return to mild and measured exercise and she is able to read young adult books.  She works as a  at [employer] and has not been able to read the level of materials required for her work - both unable to process and provokes symptoms.  She also continues to have difficulty processing and creating systems such as organizing questions for this appointment.  Patient has been happy to be able to do the mild exercise that she is able to do, but has a hard time knowing what the limits are before symptoms are provoked.      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    Separately, having allergy symptoms.  Takes antihistamine, not flonase.  Has been concerned about  "taking nasal spray.      Reviewed and updated as needed this visit by Provider         Review of Systems   ROS COMP: Constitutional, HEENT, cardiovascular, pulmonary, gi and gu systems are negative, except as otherwise noted.      Objective    /80   Pulse 94   Temp 97.7  F (36.5  C) (Oral)   Ht 1.575 m (5' 2\")   Wt 76.2 kg (168 lb)   SpO2 99%   BMI 30.73 kg/m    Body mass index is 30.73 kg/m .  Physical Exam   GENERAL: healthy, alert and no distress  MENTAL STATUS EXAM  Appearance: appropriate  Attitude: cooperative  Behavior: normal  Eye Contact: normal  Speech: normal  Orientation: oriented to person , place, time and situation  Mood:  admits sadness and anxiety most of time  Affect: Appropriate/mood-congruent and Tearful  Thought Process: clear, using notes to track    Diagnostic Test Results:  Labs reviewed in Epic        Assessment & Plan       (F07.81) Post concussion syndrome  Comment:   Plan: Symptoms persistent > 3 months since injury and unable to perform cognitive function related to her job.  Recommend TBI clinic at [facility].    (S06.0X0D) Concussion without loss of consciousness, subsequent encounter  (primary encounter diagnosis)  Comment:   Plan: ondansetron (ZOFRAN) 4 MG tablet, propranolol         (INDERAL) 20 MG tablet    Try propranolol for episodic anxiety.  Advised against benzodiazepines.  Symptoms    (R09.81) Nasal congestion  Comment:   Plan: fluticasone (FLONASE) 50 MCG/ACT nasal spray        Ok to add flonase to the allergy medications     BMI:   Estimated body mass index is 30.73 kg/m  as calculated from the following:    Height as of this encounter: 1.575 m (5' 2\").    Weight as of this encounter: 76.2 kg (168 lb).   Weight management plan: Discussed healthy diet and exercise guidelines        Patient Instructions   Propranolol 20 mg for episodic anxiety      Return in about 3 months (around 10/11/2019).    DARIUS Estrada [facility]      "

## 2019-07-16 ENCOUNTER — MYC MEDICAL ADVICE (OUTPATIENT)
Dept: FAMILY MEDICINE | Facility: CLINIC | Age: 39
End: 2019-07-16

## 2019-07-16 DIAGNOSIS — F07.81 POST CONCUSSION SYNDROME: Primary | ICD-10-CM

## 2019-07-16 NOTE — TELEPHONE ENCOUNTER
"RD Referral - can you review referral request please for concussion clinic at [facility]     Writer did check and this location is not within our [place] Network      Caroline siddiqi      Encouraged patient to contact insurance which may be beneficial if open access - but sometimes insurance responds \"yes you may go to that location if you get a referral\" - but then we cannot offer patient the referral due to our network agreements  "

## 2019-07-17 ENCOUNTER — TELEPHONE (OUTPATIENT)
Dept: FAMILY MEDICINE | Facility: CLINIC | Age: 39
End: 2019-07-17

## 2019-07-17 PROBLEM — F07.81 POST CONCUSSION SYNDROME: Status: ACTIVE | Noted: 2019-07-17

## 2019-07-17 NOTE — TELEPHONE ENCOUNTER
Savi     Also note the Mychrt message from pt today    Debra Mckeon RN   [facility]

## 2019-07-17 NOTE — TELEPHONE ENCOUNTER
Savi    Pt is requesting a external referral be sent to TBI clinic, [facility]    Referral cued    Will need to be faxed to [facility]    Pt requests call when referral is placed    She was given the contact number for HIM and also info for the TBI clinic to set up appointment and complete her registration    Debra Mckeon RN   [facility]

## 2019-07-17 NOTE — TELEPHONE ENCOUNTER
External order signed.  I only just signed the visit from last week, so could you please ask records to make sure the most recent note is included in the fax to [facility].  NUBIA Moyer

## 2019-07-17 NOTE — TELEPHONE ENCOUNTER
Left VM for [facility] TBI clinic to return call to clinic    What is the fax number, can the OV notes from 7/11/19 be faxed to that number as well as the referral    Referral and OV notes on triage desk    Debra Mckeon RN   [facility]

## 2019-07-17 NOTE — TELEPHONE ENCOUNTER
Left vm for pt to return call to clinic    Pre Registered pt with the TBI clinic, [facility]    Pt will need to get her medical records to them pertaining to the injury and tx  and assure all demographics are correct with them  She should call , [facility] to complete the demographics and set up her appointment    Debra Mckeon RN   [facility]

## 2019-07-17 NOTE — TELEPHONE ENCOUNTER
Patient has checked with insurance who said this clinic is covered under her plan.   Please call [facility] at [phone] to initiate the referral process.  Not sure if they will need any paperwork or just the info listed on the page  Nature of medical problem - post concussive syndrome with persistent physical and cognitive symptoms.  JOHAN Velazquez [facility]     TBI clinic website:  [id number]  Notes that to refer a patient go to this website:  [email or web address]  And provide this information:  We will need the following information:  Patient's name   Date of birth   Nature of the medical problem   Referring provider and phone number

## 2019-07-17 NOTE — TELEPHONE ENCOUNTER
See mychart encounter, this encounter closed    Debra Mckeon RN   [facility]

## 2019-07-17 NOTE — TELEPHONE ENCOUNTER
Spoke with Mac[facility]a  Faxed the OV to  [phone] and faxed referral to     Debra Mckeon RN   [facility]

## 2019-07-30 ENCOUNTER — TELEPHONE (OUTPATIENT)
Dept: FAMILY MEDICINE | Facility: CLINIC | Age: 39
End: 2019-07-30

## 2019-07-30 DIAGNOSIS — F07.81 POST CONCUSSION SYNDROME: Primary | ICD-10-CM

## 2019-07-30 NOTE — TELEPHONE ENCOUNTER
Savi/Provider Pool:    Please see phone message below. Cued generic referral for acupuncture services. Attempted to call Camille to clarify location requested, but number does not accept incoming calls. Please review/sign or advise.    Ania Robertson RN  [facility]

## 2019-07-30 NOTE — TELEPHONE ENCOUNTER
Please call her   A Jia will not be able to review this until tomorrow noon   And often [facility] does not cover acupuncture  At [facility] ( in my experience) so I think she should cancel tomorrow appointment until we get this all sorted out or she may get a big bill that she will be responsible for

## 2019-07-30 NOTE — TELEPHONE ENCOUNTER
Pt was referred to [facility] for a TBI, and the neurologist there has [facility] the referral has to come from the patient's PCP and not the neurologist. Referral can be faxed to [phone]. Attm: Camille. She works at [facility] and will get the referral to [facility] for the patient. Her first acupuncture appointment is tomorrow, 7/31

## 2019-07-30 NOTE — TELEPHONE ENCOUNTER
Called patient, relayed provider message below, patient verbalized understanding and is in agreement with plan.     Patient has appointment scheduled with Juana Ro CNP and will discuss need for referral at appointment 08/01/2019    Ana Lawton RN  Triage Nurse

## 2019-08-01 ENCOUNTER — OFFICE VISIT (OUTPATIENT)
Dept: FAMILY MEDICINE | Facility: CLINIC | Age: 39
End: 2019-08-01
Payer: COMMERCIAL

## 2019-08-01 ENCOUNTER — TRANSFERRED RECORDS (OUTPATIENT)
Dept: HEALTH INFORMATION MANAGEMENT | Facility: CLINIC | Age: 39
End: 2019-08-01

## 2019-08-01 VITALS
SYSTOLIC BLOOD PRESSURE: 112 MMHG | RESPIRATION RATE: 14 BRPM | HEART RATE: 80 BPM | TEMPERATURE: 98.1 F | DIASTOLIC BLOOD PRESSURE: 72 MMHG | BODY MASS INDEX: 29.15 KG/M2 | WEIGHT: 164.5 LBS | OXYGEN SATURATION: 99 % | HEIGHT: 63 IN

## 2019-08-01 DIAGNOSIS — F40.243 FLYING PHOBIA: ICD-10-CM

## 2019-08-01 DIAGNOSIS — S06.9X0D TRAUMATIC BRAIN INJURY, WITHOUT LOSS OF CONSCIOUSNESS, SUBSEQUENT ENCOUNTER: Primary | ICD-10-CM

## 2019-08-01 PROCEDURE — 99214 OFFICE O/P EST MOD 30 MIN: CPT | Performed by: NURSE PRACTITIONER

## 2019-08-01 RX ORDER — ALPRAZOLAM 0.25 MG
.25-.5 TABLET ORAL EVERY 6 HOURS PRN
Qty: 24 TABLET | Refills: 0 | Status: SHIPPED | OUTPATIENT
Start: 2019-08-01 | End: 2020-03-13

## 2019-08-01 ASSESSMENT — MIFFLIN-ST. JEOR: SCORE: 1385.17

## 2019-08-01 NOTE — PROGRESS NOTES
Subjective     Reyna Zhang is a 39 year old female who presents to clinic today for the following health issues:  Persistent symptoms from TBI. Difficulty concentrating for sustained periods; difficulty driving; difficulty with sustained mental effort. 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 She has brought [facility] paperwork to fill out today. She has also consulted with a neurologist through the TBI clinic who has recommended no biking and no heavy exercise.   She has been experiencing significant mood difficulties for the past several months; very emotionally labile, crying frequently and feeling sad. No angry outbursts. She feels like she has reached a point where she would like to try an anti-depressant. NO SI or HI.     HPI   Follow up from last appointment, paperwork and insurance questions    -------------------------------------    Patient Active Problem List   Diagnosis     Plantar warts     Carpal tunnel syndrome of left wrist     Neck pain     Cervical radiculopathy     Pain of left upper extremity     Pain of right upper extremity     Concern about breast cancer in female without diagnosis     Concussion without loss of consciousness     Post concussion syndrome     Past Surgical History:   Procedure Laterality Date     MAMMOPLASTY REDUCTION BILATERAL Bilateral 6/4/2018    Procedure: MAMMOPLASTY REDUCTION BILATERAL;  Bilateral Breast Reduction;  Surgeon: Hannah Ramires MD;  Location:  OR       Social History     Tobacco Use     Smoking status: Never Smoker     Smokeless tobacco: Never Used   Substance Use Topics     Alcohol use: Yes     Comment: 0-1/week     Family History   Problem Relation Age of Onset     Depression Mother      Glaucoma Mother      Retinal detachment Mother      Cancer Father      Depression Father  "     Retinal detachment Father      Thyroid Disease Father      Diabetes Sister      Depression Sister      Macular Degeneration No family hx of            -------------------------------------  Reviewed and updated as needed this visit by Provider         Review of Systems   ROS COMP: Constitutional, HEENT, cardiovascular, pulmonary, GI, , musculoskeletal, neuro, skin, endocrine and psych systems are negative, except as otherwise noted.      Objective    /72   Pulse 80   Temp 98.1  F (36.7  C) (Oral)   Resp 14   Ht 1.6 m (5' 2.99\")   Wt 74.6 kg (164 lb 8 oz)   LMP 07/09/2019 (Exact Date)   SpO2 99%   BMI 29.15 kg/m    Body mass index is 29.15 kg/m .  Physical Exam   GENERAL: healthy, alert and no distress  EYES: Eyes grossly normal to inspection, PERRL and conjunctivae and sclerae normal  NECK: no adenopathy, no asymmetry, masses, or scars and thyroid normal to palpation  RESP: lungs clear to auscultation - no rales, rhonchi or wheezes  CV: regular rate and rhythm, normal S1 S2, no S3 or S4, no murmur, click or rub, no peripheral edema and peripheral pulses strong  MS: no gross musculoskeletal defects noted, no edema  PSYCH: mentation appears normal, affect normal/bright    Diagnostic Test Results:  No results found for this or any previous visit (from the past 24 hour(s)).        Assessment & Plan   Problem List Items Addressed This Visit     None      Visit Diagnoses     Traumatic brain injury, without loss of consciousness, subsequent encounter    -  Primary    Relevant Medications    sertraline (ZOLOFT) 50 MG tablet    Other Relevant Orders    ACUPUNCTURE REFERRAL    PHYSICAL THERAPY REFERRAL    Flying phobia        Relevant Medications    sertraline (ZOLOFT) 50 MG tablet    ALPRAZolam (XANAX) 0.25 MG tablet       -start zoloft  -filled out paperwork for FMLA, discussed restrictions on activity      BMI:   Estimated body mass index is 29.15 kg/m  as calculated from the following:    Height as of " "this encounter: 1.6 m (5' 2.99\").    Weight as of this encounter: 74.6 kg (164 lb 8 oz).           See Patient Instructions  No follow-ups on file.    DARIUS Diamond [facility]      "

## 2019-08-02 ENCOUNTER — TELEPHONE (OUTPATIENT)
Dept: FAMILY MEDICINE | Facility: CLINIC | Age: 39
End: 2019-08-02

## 2019-08-02 NOTE — TELEPHONE ENCOUNTER
Called patient. She spoke with [employer], her insurance.     She was referred to [facility]. They have a primary person coordinating her care. She was referred to multiple people within their system. She states that it is unclear to her if and when she will need a referral for continuing care. She states that insurance told her that it matters how many number of visits she was referred for.    After deliberation, pt said that it seemed that insurance is missing information from us, so she will check back in with them on Monday and give them our number to call if they need any additional information.     Pt will call back with any further questions.    Ania Robertson RN  [facility]

## 2019-08-02 NOTE — TELEPHONE ENCOUNTER
Pt is requesting a call back regarding the specifics of her referrals from our office.    Pt can be reached @ [phone] kellie

## 2019-08-06 ENCOUNTER — TELEPHONE (OUTPATIENT)
Dept: FAMILY MEDICINE | Facility: CLINIC | Age: 39
End: 2019-08-06

## 2019-08-06 NOTE — TELEPHONE ENCOUNTER
Patient called and stated that insurance company ([employer]) is requesting someone from the clinic call regarding referrals for [facility] and [facility]. Number is [phone]. Needing information for number of visit. Defaulted to 1 visit.

## 2019-08-06 NOTE — TELEPHONE ENCOUNTER
Savi and Juana and Tanisha (referrals specialist):    Pt states that Carrillo referred her to about 6 other people/specialists. Pt is concerned about upcoming appts. She is being told conflicting info from [employer]-- that referrals need to be from PCP, and that because TBI clinic services not provided within [facility], this gets managed by provider at TBI clinic.  -Accupuncture (pt has current referral from Juana for this)  -TBI/developmental optometry  -Speech and language pathology  -PT for TBI (cranio-sacral)  -f/u with Carrillo  -PT for PT (balance etc)  -[facility] because [facility] couldn't fit pt in (pt has referral for this from Juana).     [facility], seems to be going well, then on the 12th. Please advise do you have any input on pt's question?     Called [employer]. Spoke with representative who stated that she is not showing from their documentation any information needed from our clinic. She is wondering if there is a claim out there or something? So far, there is a violation with no referral for Sameera Guadarrama? Representative at [employer] was unable to provide any insight on further information needed from them.     Writer asked if message could be sent to person who had spoke with pt requesting that someone from our clinic call. They stated that they will try to ask if there was any further information needed for referrals form our clinic. State that they have two referrals-- one for [facility] and another for [facility].    Ania Robertson RN  [facility]

## 2019-08-06 NOTE — TELEPHONE ENCOUNTER
Hx TBI.  At visit of 8/1/19 ELIESER Ro did start rx sertraline 50 mg  And pt  to return for check up in one month with her.

## 2019-08-06 NOTE — TELEPHONE ENCOUNTER
Juana,    Pt is wondering how important that she starts sertraline. Is it okay that she thinks about this longer? Pt did fill prescription, but then had therapy appt next day. She has not started medication yet.    Pt's psychologist gave opinion that she shouldn't do Zoloft. He does not prescribe. He said that it will take 3-4 weeks to know if it is working, and in the meantime, she is doing TBI work that he thinks should help mitigate anxiety. He was questioning whether it is the best fit for her. He wasn't trying to say that he knows better, but was thinking maybe she would see changes soon from TBI therapy, and then it might be hard to sort out issues going on between the medication/side effects and her improvement with therapy. He wasn't sure about cost-benefit d/t side effects associated with taking the medication.    Please advise.    Ania Robertson RN  [facility]

## 2019-08-06 NOTE — TELEPHONE ENCOUNTER
Reason for call:  Other   Patient called regarding (reason for call): call back  Additional comments:   Patient called stating she was talking to her therapist who suggested not taking zoloft.   Patient is wondering if this is just a recommendation and if there are any other options/     Phone number to reach patient:  Home number on file [phone] (home)    Best Time:  any    Can we leave a detailed message on this number?  YES

## 2019-08-07 NOTE — TELEPHONE ENCOUNTER
Called patient. She states that she will send over a list of all of her upcoming appts with their specialties and locations so that we have those details to add to the referrals. Pt is going to attach this information via [employer].    Ania Robertson RN  [facility]

## 2019-08-07 NOTE — TELEPHONE ENCOUNTER
I think it's fine to wait and see. I think that if you have significant anxiety for several months its reasonable to start an antidepressant, even if therapy is likely to be helpful as well, but if she feels more comfortable waiting and not taking something, that's totally reasonable.

## 2019-08-07 NOTE — TELEPHONE ENCOUNTER
Maybe we should place the referrals just in case, as we are not able to do retroactive referrals? Could you pend for me please?

## 2019-08-07 NOTE — TELEPHONE ENCOUNTER
So it sounds like we do not need to place any referrals yet, but the insurance will call us if these are necessary?

## 2019-08-12 ENCOUNTER — MYC MEDICAL ADVICE (OUTPATIENT)
Dept: FAMILY MEDICINE | Facility: CLINIC | Age: 39
End: 2019-08-12

## 2019-08-12 NOTE — TELEPHONE ENCOUNTER
Mike Stevenson MD  You 27 minutes ago (11:47 AM)      Please involve the referral specialist as well as our clinic manager and see what we are able to do   Also this may just need to wait for AP to return next as we do not know the whole patient story    Routing comment        Sent patient joanne

## 2019-08-12 NOTE — TELEPHONE ENCOUNTER
RD Referrals - may we ask for your help please in reviewing these referrals - patient is running into obstacles

## 2019-08-12 NOTE — TELEPHONE ENCOUNTER
Routing to provider - Pine/JENNIFER - please review and advise as appropriate    Patient advising [employer] is requiring a 'transition of care' related to TBI to be initiated for ongoing care instead of a referral    We can discuss this with referral specialist as needed as well    ULISESI: Jia is out of the clinic for the week

## 2019-08-13 NOTE — TELEPHONE ENCOUNTER
Writer called [employer] option 4 (per-certification department). Spoke with rep at [employer]. They said that Ilda is going to be the nurse to sign to it, and stated that it should be [facility] TBI clinic calling to initiate the transition of care.     Spoke with patient. She gave writer phone numbers for [facility]:  [phone]--  desk  [phone]-- (TBI nurse line) Can take up to 48 hrs to get a return call.   Per pt, Suzy ARRIETA is the person to that she spoke with at [employer]. Suzy's fax: [phone]. Called and LVM for a nurse to call our clinic.    Spoke with Tanisha MARQUIS In referrals, she states that she is not familiar with transition of care or doing these and agreed that pt should contact Carrillo Lockhart NP to request that they do this.    Undertone message sent to patient.     Ania Robertson RN  [facility]

## 2019-08-14 NOTE — TELEPHONE ENCOUNTER
Called TBI clinic (scheduling desk). Writer on hold for 20+ minutes and not able to talk with a person. Will recall.    Ania Robertson RN  [facility]

## 2019-08-15 NOTE — TELEPHONE ENCOUNTER
Called [facility]. Spoke with  who said that they believe that Carrillo's nurse is in today. Writer was transferred to nurse line. [facility] to call clinic.  Naehas message/update sent to pt.    Ania Robertson RN  [facility]

## 2019-08-15 NOTE — TELEPHONE ENCOUNTER
Received call from Priya with [facility], advised Priya that their clinic will need to initiate Transition of Care as patient mentioned in MyChart message. Priya verbalized understanding and is in agreement with this plan    Ana Lawton, RN  Triage Nurse

## 2019-08-19 ENCOUNTER — MYC MEDICAL ADVICE (OUTPATIENT)
Dept: FAMILY MEDICINE | Facility: CLINIC | Age: 39
End: 2019-08-19

## 2019-08-19 NOTE — TELEPHONE ENCOUNTER
Mohinderdled with Tanisha who stated that she will submit online referral to [employer] for [place] [facility] as soon as she has access-- the issue is that they are within network (so shouldn't even need a referral), but not the preferred tier (Tier 1). Stated that she will do visit authorization at 1 visit per day for the remainder of the year. A new referral would need to be placed Jan 1st if pt still going to them for visits at that time.     Called patient. joanne MNEA message sent to patient.     Ania Robertson RN  [facility]

## 2019-08-19 NOTE — TELEPHONE ENCOUNTER
Routing to Tanisha (referrals specialist) and PCP,    Please see mychart message from patient. Referrals department-- Pt mentioned that the referrals can be done online, is this something that you are able to do?    Juana-- what would you like to put for the # of visits for referral? (See mychart message from pt, it can be anything (99, 100. 365 etc).    Called Nehal at [employer], LVM to call clinic.   Called RAJESH Garay.  [employer] message sent to patient.    Ania Robertson RN  [facility]

## 2019-08-19 NOTE — TELEPHONE ENCOUNTER
Sure, can we just addend it to be 365, and no particular provider? Do we need to re-write referral or will a verbal suffice? Thanks

## 2019-08-22 ENCOUNTER — MYC MEDICAL ADVICE (OUTPATIENT)
Dept: FAMILY MEDICINE | Facility: CLINIC | Age: 39
End: 2019-08-22

## 2019-08-22 ENCOUNTER — OFFICE VISIT (OUTPATIENT)
Dept: FAMILY MEDICINE | Facility: CLINIC | Age: 39
End: 2019-08-22
Payer: COMMERCIAL

## 2019-08-22 VITALS
WEIGHT: 168.9 LBS | BODY MASS INDEX: 29.93 KG/M2 | TEMPERATURE: 98.1 F | SYSTOLIC BLOOD PRESSURE: 102 MMHG | RESPIRATION RATE: 12 BRPM | OXYGEN SATURATION: 100 % | DIASTOLIC BLOOD PRESSURE: 78 MMHG | HEIGHT: 63 IN | HEART RATE: 86 BPM

## 2019-08-22 DIAGNOSIS — M54.12 CERVICAL RADICULOPATHY: ICD-10-CM

## 2019-08-22 DIAGNOSIS — S06.0X0D CONCUSSION WITHOUT LOSS OF CONSCIOUSNESS, SUBSEQUENT ENCOUNTER: ICD-10-CM

## 2019-08-22 DIAGNOSIS — F07.81 POST CONCUSSION SYNDROME: ICD-10-CM

## 2019-08-22 DIAGNOSIS — K64.4 EXTERNAL HEMORRHOIDS: Primary | ICD-10-CM

## 2019-08-22 PROCEDURE — 99214 OFFICE O/P EST MOD 30 MIN: CPT | Performed by: NURSE PRACTITIONER

## 2019-08-22 ASSESSMENT — MIFFLIN-ST. JEOR: SCORE: 1405.26

## 2019-08-22 NOTE — PROGRESS NOTES
Subjective     Reyna Zhang is a 39 year old female who presents to clinic today for the following health issues:    HPI   Patient here to get a letter for work and talk about Medications.      Ear pain      Duration: Weeks    Description  ear pain right side into jaw on right side; ongoing right sided neck pain several months    Severity: mild    Accompanying signs and symptoms: none    History (predisposing factors):  concussion    Precipitating or alleviating factors: wearing ear plugs more often, more sensitive to noise and light    Therapies tried and outcome:  none    Anxiety  Recently has decreased due to decreasing activity according to restrictions by neurologist at TBI clinic. This has been helpful and she is feeling less overwhelmed, so decided not to start Zoloft.   Ongoing issues with external hemorrhoids- stools have been more normal recently, but having persistent discomfort with hemmorhoids.   Patient Active Problem List   Diagnosis     Plantar warts     Carpal tunnel syndrome of left wrist     Neck pain     Cervical radiculopathy     Pain of left upper extremity     Pain of right upper extremity     Concern about breast cancer in female without diagnosis     Concussion without loss of consciousness     Post concussion syndrome     Past Surgical History:   Procedure Laterality Date     MAMMOPLASTY REDUCTION BILATERAL Bilateral 6/4/2018    Procedure: MAMMOPLASTY REDUCTION BILATERAL;  Bilateral Breast Reduction;  Surgeon: Hannah Ramires MD;  Location: [facility] OR       Social History     Tobacco Use     Smoking status: Never Smoker     Smokeless tobacco: Never Used   Substance Use Topics     Alcohol use: Yes     Comment: 0-1/week     Family History   Problem Relation Age of Onset     Depression Mother      Glaucoma Mother      Retinal detachment Mother      Cancer Father      Depression Father      Retinal detachment Father      Thyroid Disease Father      Diabetes Sister      Depression Sister   "    Macular Degeneration No family hx of              Reviewed and updated as needed this visit by Provider         Review of Systems   ROS COMP: Constitutional, HEENT, cardiovascular, pulmonary, gi and gu systems are negative, except as otherwise noted.      Objective    /78   Pulse 86   Temp 98.1  F (36.7  C) (Temporal)   Resp 12   Ht 1.6 m (5' 3\")   Wt 76.6 kg (168 lb 14.4 oz)   LMP 08/06/2019 (Exact Date)   SpO2 100%   BMI 29.92 kg/m    Body mass index is 29.92 kg/m .  Physical Exam   GENERAL: healthy, alert and no distress  HENT: ear canals and TM's normal, nose and mouth without ulcers or lesions  NECK: no adenopathy, no asymmetry, masses, or scars and thyroid normal to palpation  RESP: lungs clear to auscultation - no rales, rhonchi or wheezes  CV: regular rate and rhythm, normal S1 S2, no S3 or S4, no murmur, click or rub, no peripheral edema and peripheral pulses strong  MS: no gross musculoskeletal defects noted, no edema    Diagnostic Test Results:  Labs reviewed in Epic  No results found for this or any previous visit (from the past 24 hour(s)).        Assessment & Plan   Problem List Items Addressed This Visit     Post concussion syndrome    Concussion without loss of consciousness    Cervical radiculopathy      Other Visit Diagnoses     External hemorrhoids    -  Primary    Relevant Medications    hydrocortisone (ANUSOL-HC) 2.5 % cream         Ear pain likely radiating from neck discomfort- ears normal on exam. She plans to follow up with physical therapy concerning neck pain.     BMI:   Estimated body mass index is 29.92 kg/m  as calculated from the following:    Height as of this encounter: 1.6 m (5' 3\").    Weight as of this encounter: 76.6 kg (168 lb 14.4 oz).           See Patient Instructions  No follow-ups on file.    DARIUS Diamond [facility]  I spent greater than 50% of this 30 minute appointment were spent in face to face counseling with the " patient of the issues described above in the history of present illness and in the plan, including coordinating care with TBI clinic

## 2019-08-22 NOTE — TELEPHONE ENCOUNTER
Routing to Tanisha, yulissa. Please see mychart message from patient. Please advise.    Ania Robertson RN  [facility]

## 2019-09-10 NOTE — TELEPHONE ENCOUNTER
Patient called and states that Nehal ([phone]) from [facility] talked to the patient and states that she has attempted many time to get I contact with Tanisha to help walk her through the referral process so it gets done correctly, but has had no luck reaching Tanisha and has not received a call back either. She told the patient that she is done trying to reach Tanisha, but is willing to work with anyone who has the ability to help this referral get done correctly. Patient would like someone to give Nehal a call to have this referral put in ASAP.Can reach Nehal at [phone]. Patient would like to receive a call or a [employer] message from a nurse to make sure that this get's completed. Can reach patient at [phone]. Ok to leave detailed message.

## 2019-09-10 NOTE — TELEPHONE ENCOUNTER
Routing to PCP, Management and referral department. (Please also refer to [employer] encounter 08/19/19)    Called RAJESH Calvert to call clinic as writer unsure what is needed for referral and if it can be completed by a nurse or not.    [employer] message sent to patient.

## 2019-09-18 NOTE — TELEPHONE ENCOUNTER
Call placed to Tanisha and Nehal.  Left VM messages for  both to  call writer back.  Also sent a message to Artemio [facility] Ambulatory Care Manager for direction to get this  referral  moving.  I will check in with her tomorrow for  status update.    Philly Lucero RN  Patient Care Supervisor  [facility]

## 2019-09-19 NOTE — TELEPHONE ENCOUNTER
Spoke with Tanisha Chacon regarding referral situation.  She is going to edit referral to be  quantity of 200 visits, which should be enough through the end of 2019 to cover patient's appointments, even if she has speech and physical therapy on the same day.  The referral care coordinator will call patient today with the above information.  Patient will most likely need new referrals in the beginning of 2020 if she wants to go out-of network.    Philly Lucero RN-Patient Care Supervisor  [facility]

## 2019-11-08 ENCOUNTER — HEALTH MAINTENANCE LETTER (OUTPATIENT)
Age: 39
End: 2019-11-08

## 2019-12-10 ENCOUNTER — OFFICE VISIT (OUTPATIENT)
Dept: FAMILY MEDICINE | Facility: CLINIC | Age: 39
End: 2019-12-10
Payer: COMMERCIAL

## 2019-12-10 VITALS
TEMPERATURE: 97.5 F | SYSTOLIC BLOOD PRESSURE: 114 MMHG | DIASTOLIC BLOOD PRESSURE: 76 MMHG | BODY MASS INDEX: 31.08 KG/M2 | HEIGHT: 63 IN | OXYGEN SATURATION: 99 % | HEART RATE: 110 BPM | WEIGHT: 175.4 LBS

## 2019-12-10 DIAGNOSIS — J06.9 UPPER RESPIRATORY TRACT INFECTION, UNSPECIFIED TYPE: Primary | ICD-10-CM

## 2019-12-10 PROCEDURE — 99213 OFFICE O/P EST LOW 20 MIN: CPT | Performed by: NURSE PRACTITIONER

## 2019-12-10 ASSESSMENT — MIFFLIN-ST. JEOR: SCORE: 1434.74

## 2019-12-10 NOTE — PROGRESS NOTES
Vernon Zhang is a 39 year old female who presents to clinic today for the following health issues:    HPI   Acute Illness   Acute illness concerns: Cough  Onset: last Tuesday or Wednesday     Fever: no    Chills/Sweats: no    Headache (location?): YES    Sinus Pressure:no    Conjunctivitis:  no    Ear Pain: YES- some ringing     Rhinorrhea: YES    Congestion: YES    Sore Throat: YES     Cough: YES    Wheeze: no     Decreased Appetite: no    Nausea: no    Vomiting: no    Diarrhea:  no    Dysuria/Freq.: no    Fatigue/Achiness: YES    Sick/Strep Exposure: YES- family has been sick      Therapies Tried and outcome: benadryl, Vicks vapor, cough drops.     -------------------------------------    Patient Active Problem List   Diagnosis     Plantar warts     Carpal tunnel syndrome of left wrist     Neck pain     Cervical radiculopathy     Pain of left upper extremity     Pain of right upper extremity     Concern about breast cancer in female without diagnosis     Concussion without loss of consciousness     Post concussion syndrome     Past Surgical History:   Procedure Laterality Date     MAMMOPLASTY REDUCTION BILATERAL Bilateral 6/4/2018    Procedure: MAMMOPLASTY REDUCTION BILATERAL;  Bilateral Breast Reduction;  Surgeon: Hannah Ramires MD;  Location: [facility] OR       Social History     Tobacco Use     Smoking status: Never Smoker     Smokeless tobacco: Never Used   Substance Use Topics     Alcohol use: Yes     Comment: 0-1/week     Family History   Problem Relation Age of Onset     Depression Mother      Glaucoma Mother      Retinal detachment Mother      Cancer Father      Depression Father      Retinal detachment Father      Thyroid Disease Father      Diabetes Sister      Depression Sister      Macular Degeneration No family hx of            -------------------------------------  Reviewed and updated as needed this visit by Provider         Review of Systems   ROS COMP: Constitutional, HEENT,  "cardiovascular, pulmonary, gi and gu systems are negative, except as otherwise noted.      Objective    /76   Pulse 110   Temp 97.5  F (36.4  C) (Oral)   Ht 1.6 m (5' 3\")   Wt 79.6 kg (175 lb 6.4 oz)   SpO2 99%   BMI 31.07 kg/m    Body mass index is 31.07 kg/m .  Physical Exam   GENERAL: healthy, alert and no distress  HENT: ear canals and TM's normal, nose and mouth without ulcers or lesions  NECK: no adenopathy, no asymmetry, masses, or scars and thyroid normal to palpation  RESP: lungs clear to auscultation - no rales, rhonchi or wheezes  CV: regular rate and rhythm, normal S1 S2, no S3 or S4, no murmur, click or rub, no peripheral edema and peripheral pulses strong  ABDOMEN: soft, nontender, no hepatosplenomegaly, no masses and bowel sounds normal  MS: no gross musculoskeletal defects noted, no edema    Diagnostic Test Results:  Labs reviewed in Epic  No results found for this or any previous visit (from the past 24 hour(s)).        Assessment & Plan   Problem List Items Addressed This Visit     None      Visit Diagnoses     Upper respiratory tract infection, unspecified type    -  Primary       recommended rest and fluids; follow up if no improvement        See Patient Instructions  No follow-ups on file.    DARIUS Diamond [facility]      "

## 2020-02-05 ENCOUNTER — MYC MEDICAL ADVICE (OUTPATIENT)
Dept: FAMILY MEDICINE | Facility: CLINIC | Age: 40
End: 2020-02-05

## 2020-02-05 DIAGNOSIS — S06.9X0D TRAUMATIC BRAIN INJURY, WITHOUT LOSS OF CONSCIOUSNESS, SUBSEQUENT ENCOUNTER: Primary | ICD-10-CM

## 2020-02-05 NOTE — TELEPHONE ENCOUNTER
Nahomi,    Please get these referrals authorized ASA. Patient sent request on 1/20/20, but for some reason we never received her request?    Thanks  Margarita Marroquin RN   [facility]

## 2020-02-05 NOTE — TELEPHONE ENCOUNTER
Juana,    Please see patients my chart message.     As I reviewed the chart I see no message from patient on 1/20/20. Not sure why we never received this request. I will call patient and talk with her after you sign referral.    Thanks  Margarita Marroquin RN   [facility]

## 2020-02-23 ENCOUNTER — HEALTH MAINTENANCE LETTER (OUTPATIENT)
Age: 40
End: 2020-02-23

## 2020-04-17 ENCOUNTER — MYC REFILL (OUTPATIENT)
Dept: FAMILY MEDICINE | Facility: CLINIC | Age: 40
End: 2020-04-17

## 2020-04-17 ENCOUNTER — MYC MEDICAL ADVICE (OUTPATIENT)
Dept: FAMILY MEDICINE | Facility: CLINIC | Age: 40
End: 2020-04-17

## 2020-04-17 DIAGNOSIS — R09.81 NASAL CONGESTION: ICD-10-CM

## 2020-04-17 RX ORDER — FLUTICASONE PROPIONATE 50 MCG
1-2 SPRAY, SUSPENSION (ML) NASAL DAILY
Qty: 16 G | Refills: 3 | Status: SHIPPED | OUTPATIENT
Start: 2020-04-17 | End: 2020-04-17

## 2020-04-17 RX ORDER — FLUTICASONE PROPIONATE 50 MCG
1-2 SPRAY, SUSPENSION (ML) NASAL DAILY
Qty: 48 G | Refills: 3 | Status: SHIPPED | OUTPATIENT
Start: 2020-04-17

## 2020-06-12 ENCOUNTER — MYC MEDICAL ADVICE (OUTPATIENT)
Dept: FAMILY MEDICINE | Facility: CLINIC | Age: 40
End: 2020-06-12

## 2020-06-12 NOTE — TELEPHONE ENCOUNTER
Response sent to patient via [employer].     Patric Beltran RN   [facility]

## 2020-06-15 ENCOUNTER — VIRTUAL VISIT (OUTPATIENT)
Dept: FAMILY MEDICINE | Facility: CLINIC | Age: 40
End: 2020-06-15
Payer: COMMERCIAL

## 2020-06-15 DIAGNOSIS — S06.0X0D CONCUSSION WITHOUT LOSS OF CONSCIOUSNESS, SUBSEQUENT ENCOUNTER: ICD-10-CM

## 2020-06-15 DIAGNOSIS — G44.209 TENSION HEADACHE: ICD-10-CM

## 2020-06-15 DIAGNOSIS — F07.81 POST CONCUSSION SYNDROME: Primary | ICD-10-CM

## 2020-06-15 PROCEDURE — 99215 OFFICE O/P EST HI 40 MIN: CPT | Mod: GT | Performed by: NURSE PRACTITIONER

## 2020-06-15 RX ORDER — ONDANSETRON 4 MG/1
4 TABLET, ORALLY DISINTEGRATING ORAL EVERY 8 HOURS PRN
Qty: 18 TABLET | Refills: 1 | Status: SHIPPED | OUTPATIENT
Start: 2020-06-15

## 2020-06-15 RX ORDER — METAXALONE 800 MG/1
800 TABLET ORAL 3 TIMES DAILY PRN
Qty: 30 TABLET | Refills: 1 | Status: SHIPPED | OUTPATIENT
Start: 2020-06-15

## 2020-06-15 NOTE — PATIENT INSTRUCTIONS
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    Look into an integrative physical therapy practice  [email or web address]    Meditation garret  [email or web address]?utm_campaign=cv_response_website_[facility]&utm_medium=website&utm_source=cv_response    [employer]
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
## 2020-06-22 ENCOUNTER — MYC MEDICAL ADVICE (OUTPATIENT)
Dept: FAMILY MEDICINE | Facility: CLINIC | Age: 40
End: 2020-06-22

## 2020-06-22 NOTE — TELEPHONE ENCOUNTER
Routing to provider - Caroline - please review and advise as appropriate    See joanne patient has additional questions regarding management of headache    Last office visit 6/15/20

## 2020-12-06 ENCOUNTER — HEALTH MAINTENANCE LETTER (OUTPATIENT)
Age: 40
End: 2020-12-06

## 2021-05-31 ENCOUNTER — RECORDS - HEALTHEAST (OUTPATIENT)
Dept: ADMINISTRATIVE | Facility: CLINIC | Age: 41
End: 2021-05-31

## 2021-08-10 DIAGNOSIS — F40.243 FLYING PHOBIA: ICD-10-CM

## 2021-08-10 RX ORDER — ALPRAZOLAM 0.25 MG
.25-.5 TABLET ORAL EVERY 6 HOURS PRN
Qty: 30 TABLET | Refills: 0 | Status: SHIPPED | OUTPATIENT
Start: 2021-08-10 | End: 2022-05-18

## 2021-08-10 NOTE — TELEPHONE ENCOUNTER
Juana,    Please see message below - med cued. Spoke with pt, she's doing ok - just feels like she could use some alprazolam to help with anxiety she's having about possibly having COVID. Had testing done this AM.    Requested Prescriptions   Pending Prescriptions Disp Refills     ALPRAZolam (XANAX) 0.25 MG tablet 30 tablet 0     Sig: Take 1-2 tablets (0.25-0.5 mg) by mouth every 6 hours as needed for anxiety       There is no refill protocol information for this order        Routing refill request to provider for review/approval because:  Drug not on the [facility] refill protocol     Kriss Herrera RN  [facility]

## 2021-08-10 NOTE — TELEPHONE ENCOUNTER
Reason for Call:  Other prescription    Detailed comments: Pt and son has covid like sxs, was just tested this am, she is anxious about this. Pt would like to speak with provider about getting a refill on the Alprazolam.     Phone Number Patient can be reached at: Home number on file [phone] (home)    Best Time: anytime    Can we leave a detailed message on this number? YES    Call taken on 8/10/2021 at 11:45 AM by Gisela Romero

## 2021-09-25 ENCOUNTER — HEALTH MAINTENANCE LETTER (OUTPATIENT)
Age: 41
End: 2021-09-25

## 2021-12-15 ENCOUNTER — TELEPHONE (OUTPATIENT)
Dept: FAMILY MEDICINE | Facility: CLINIC | Age: 41
End: 2021-12-15
Payer: COMMERCIAL

## 2021-12-15 DIAGNOSIS — Z23 HIGH PRIORITY FOR COVID-19 VACCINATION: Primary | ICD-10-CM

## 2021-12-15 NOTE — PROGRESS NOTES
Patient wishes to get covid vaccine booster.  Received J&J vaccine initially and then Pfizer in June 2021, making it >6 months past the mRNA vaccine.  Off algorithm for vaccine booster administration.  Second mRNA is reasonable.  Signed for Moderna and will send to triage to schedule patient.  NUBIA Moyer

## 2021-12-15 NOTE — TELEPHONE ENCOUNTER
Savi,    Called pt to assist with scheduling, reviewed [facility] while on the call.    Pt received J&J on 3/13/21, Pfizer booster dose on 7/2/21.        Pt is not eligible for another COVID booster shot at this time. Pt notified.      Kriss Herrera RN  [facility]

## 2021-12-15 NOTE — TELEPHONE ENCOUNTER
Message sent by DARIUS Akhtar CNP:    Patient wishes to get covid vaccine booster.  Received J&J vaccine initially and then Pfizer in June 2021, making it >6 months past the mRNA vaccine.  Off algorithm for vaccine booster administration.  Second mRNA is reasonable.  Signed for Moderna and will send to triage to schedule patient.  NUBIA Moyer

## 2021-12-16 NOTE — TELEPHONE ENCOUNTER
I've been mulling this over this morning and here's what I'm thinking and my rationale.      The patient falls entirely off algorithm because there are no studies looking at a viral vector vaccine like J&J plus an mRNA vaccine combination that is 5 1.2 months old - in the context of Omicron variant.    We know that one J&J was not sufficient and that addition of a single mRNA improved immunity and transmission - in Delta variant.    We know that the government and federal health services think boosters are essential to lower Omicron transmission.  We have the potential for a two week wait for this patient to get a second mRNA dose to occur during a rapid rise in cases in our community.    We know that when Omicron shows up in a community, the transmission line is essentially vertical meaning cases rise faster than any other variant we've seen.  This means our protocols and recommendations likely can't keep up with the evolution of community spread.    The patient is around vulnerable adults.    There is low risk for an additional dose two weeks ahead of the six month leah (which is artifical anyway since we are off algorithm). She is aware of any potential side effects and gives informed consent.    We have no local shortage of vaccine.    I think that given a lack of very specific guidance for this patient plus the Omicron variant makes it reasonable to administer a Moderna booster dose now instead of in two weeks.    Would it be possible to give the vaccine on the basis of my rationale and prescription?    Thanks for reviewing!  If it will work to give it, please call the patient to get her scheduled to come in today or tomorrow.  Thank you so much!  NUBIA Moyer

## 2022-01-15 ENCOUNTER — HEALTH MAINTENANCE LETTER (OUTPATIENT)
Age: 42
End: 2022-01-15

## 2022-05-18 ENCOUNTER — MYC REFILL (OUTPATIENT)
Dept: FAMILY MEDICINE | Facility: CLINIC | Age: 42
End: 2022-05-18
Payer: COMMERCIAL

## 2022-05-18 DIAGNOSIS — F40.243 FLYING PHOBIA: ICD-10-CM

## 2022-05-19 RX ORDER — ALPRAZOLAM 0.25 MG
.25-.5 TABLET ORAL EVERY 6 HOURS PRN
Qty: 30 TABLET | Refills: 0 | Status: SHIPPED | OUTPATIENT
Start: 2022-05-19

## 2022-05-19 NOTE — TELEPHONE ENCOUNTER
Routing refill request to provider for review/approval because:  Drug not on the [facility] refill protocol refill request alprazolam 0.25mg    Elly Zimmer RN  [facility]

## 2022-05-22 ENCOUNTER — OFFICE VISIT (OUTPATIENT)
Dept: URGENT CARE | Facility: URGENT CARE | Age: 42
End: 2022-05-22
Payer: COMMERCIAL

## 2022-05-22 VITALS
WEIGHT: 170 LBS | HEART RATE: 107 BPM | DIASTOLIC BLOOD PRESSURE: 84 MMHG | OXYGEN SATURATION: 100 % | SYSTOLIC BLOOD PRESSURE: 136 MMHG | BODY MASS INDEX: 30.12 KG/M2 | HEIGHT: 63 IN | TEMPERATURE: 97.8 F | RESPIRATION RATE: 16 BRPM

## 2022-05-22 DIAGNOSIS — R07.0 THROAT PAIN: Primary | ICD-10-CM

## 2022-05-22 DIAGNOSIS — J06.9 VIRAL URI: ICD-10-CM

## 2022-05-22 LAB — DEPRECATED S PYO AG THROAT QL EIA: NEGATIVE

## 2022-05-22 PROCEDURE — U0005 INFEC AGEN DETEC AMPLI PROBE: HCPCS | Performed by: NURSE PRACTITIONER

## 2022-05-22 PROCEDURE — 87651 STREP A DNA AMP PROBE: CPT | Performed by: NURSE PRACTITIONER

## 2022-05-22 PROCEDURE — 99213 OFFICE O/P EST LOW 20 MIN: CPT | Performed by: NURSE PRACTITIONER

## 2022-05-22 PROCEDURE — U0003 INFECTIOUS AGENT DETECTION BY NUCLEIC ACID (DNA OR RNA); SEVERE ACUTE RESPIRATORY SYNDROME CORONAVIRUS 2 (SARS-COV-2) (CORONAVIRUS DISEASE [COVID-19]), AMPLIFIED PROBE TECHNIQUE, MAKING USE OF HIGH THROUGHPUT TECHNOLOGIES AS DESCRIBED BY CMS-2020-01-R: HCPCS | Performed by: NURSE PRACTITIONER

## 2022-05-22 ASSESSMENT — ENCOUNTER SYMPTOMS
VOMITING: 0
COUGH: 1
NAUSEA: 1
BACK PAIN: 1
FEVER: 1
CARDIOVASCULAR NEGATIVE: 1
HEADACHES: 1
SORE THROAT: 1
MYALGIAS: 1
EYES NEGATIVE: 1
CHILLS: 1

## 2022-05-23 ENCOUNTER — NURSE TRIAGE (OUTPATIENT)
Dept: NURSING | Facility: CLINIC | Age: 42
End: 2022-05-23
Payer: COMMERCIAL

## 2022-05-23 LAB — GROUP A STREP BY PCR: NOT DETECTED

## 2022-05-23 NOTE — PROGRESS NOTES
HPI  Reyna Zhang 41 year old presents to the urgent care with chief complaint of sore throat, otalgia, malaise, back pain, and some mild cough.  Reports that her daughter has been ill and is being treated for strep.  Daughter it was tested for COVID and it was initially negative.  Patient has been taking over-the-counter Tylenol with some relief.  She is also notes that she is both vaccinated and boosted for COVID.    Review of Systems   Constitutional: Positive for chills, fever and malaise/fatigue.   HENT: Positive for sore throat.    Eyes: Negative.    Respiratory: Positive for cough.    Cardiovascular: Negative.    Gastrointestinal: Positive for nausea. Negative for vomiting.   Genitourinary: Negative.    Musculoskeletal: Positive for back pain and myalgias.   Neurological: Positive for headaches.   Endo/Heme/Allergies: Negative.       ROS: 10 point ROS neg other than the symptoms noted above in the HPI.  No past medical history on file.  Patient Active Problem List   Diagnosis     Plantar warts     Carpal tunnel syndrome of left wrist     Neck pain     Cervical radiculopathy     Pain of left upper extremity     Pain of right upper extremity     Concern about breast cancer in female without diagnosis     Concussion without loss of consciousness     Post concussion syndrome      Past Surgical History:   Procedure Laterality Date     MAMMOPLASTY REDUCTION BILATERAL Bilateral 6/4/2018    Procedure: MAMMOPLASTY REDUCTION BILATERAL;  Bilateral Breast Reduction;  Surgeon: Hannah Ramires MD;  Location: [facility] OR     Allergies   Allergen Reactions     Macrobid [Nitrofurantoin]      Sulfa Drugs      Amoxicillin Rash     Current Outpatient Medications   Medication     fluticasone (FLONASE) 50 MCG/ACT nasal spray     ALPRAZolam (XANAX) 0.25 MG tablet     hydrocortisone (ANUSOL-HC) 2.5 % cream     metaxalone (SKELAXIN) 800 MG tablet     ondansetron (ZOFRAN) 4 MG tablet     ondansetron (ZOFRAN-ODT) 4 MG ODT tab  "    No current facility-administered medications for this visit.         Physical Exam  Vitals and nursing note reviewed.   Constitutional:       General: She is not in acute distress.     Appearance: She is toxic-appearing.      Comments: /84   Pulse 107   Temp 97.8  F (36.6  C) (Temporal)   Resp 16   Ht 1.6 m (5' 3\")   Wt 77.1 kg (170 lb)   LMP 05/05/2022   SpO2 100%   BMI 30.11 kg/m       HENT:      Head: Normocephalic.      Right Ear: Tympanic membrane normal.      Left Ear: Tympanic membrane normal.      Nose: Nose normal. No rhinorrhea.      Mouth/Throat:      Mouth: Mucous membranes are moist.      Pharynx: No oropharyngeal exudate or posterior oropharyngeal erythema.   Eyes:      General:         Right eye: No discharge.         Left eye: No discharge.      Conjunctiva/sclera: Conjunctivae normal.      Pupils: Pupils are equal, round, and reactive to light.   Cardiovascular:      Rate and Rhythm: Normal rate.      Heart sounds: Normal heart sounds.   Pulmonary:      Effort: Pulmonary effort is normal.      Breath sounds: Normal breath sounds.   Abdominal:      Tenderness: There is no abdominal tenderness.   Musculoskeletal:      Cervical back: Normal range of motion.   Lymphadenopathy:      Cervical: No cervical adenopathy.   Skin:     General: Skin is warm and dry.      Capillary Refill: Capillary refill takes less than 2 seconds.   Neurological:      Mental Status: She is alert and oriented to person, place, and time.       Results for orders placed or performed in visit on 05/22/22   Streptococcus A Rapid Screen w/Reflex to PCR - Clinic Collect     Status: Normal    Specimen: Throat; Swab   Result Value Ref Range    Group A Strep antigen Negative Negative     Covid test pending     Assessment:  1. Throat pain    2. Viral URI        Plan:  Orders Placed This Encounter     Symptomatic; Unknown COVID-19 Virus (Coronavirus) by PCR Nose   Tylenol or Ibuprofen as directed on package for pain or " fever  Rest, Fluids  Strep culture  Pending   Instructions regarding self-care of patient reviewed.   Written instructions provided in after visit summary and reviewed.  Patient instructed to see primary care provider for new or persistent symptoms.   Red flag symptoms reviewed and patient has been instructed to seek emergent care  Continue other medications as previously prescribed.    Abby Madrid, DNP, APRN, CNP    The use of Dragon/Recipharm dictation services may have been used to construct the content in this note;   any grammatical or spelling errors are non-intentional. Please contact the author of this note directly if you   are in need of any clarification.

## 2022-05-24 ENCOUNTER — TELEPHONE (OUTPATIENT)
Dept: FAMILY MEDICINE | Facility: CLINIC | Age: 42
End: 2022-05-24
Payer: COMMERCIAL

## 2022-05-24 LAB — SARS-COV-2 RNA RESP QL NAA+PROBE: NEGATIVE

## 2022-05-24 NOTE — TELEPHONE ENCOUNTER
Coronavirus (COVID-19) Notification     Reason for call  Patient requesting results     Lab Result    Lab test 2019-nCoV rRt-PCR in process     RN Recommendations/Instructions per [facility]  Continue to quarantine and follow the instructions given at your testing visit until you receive the results.     Please Contact your PCP clinic or return to the Emergency department if your:    Symptoms worsen or other concerning symptom's.     Patient informed that if test for COVID19 is POSITIVE,  you will receive a call typically within 48 hours from the test date (date lab collected).  If NEGATIVE result, you will receive a letter in the mail or MyChart.      Tiffanie Caba RN

## 2022-05-24 NOTE — TELEPHONE ENCOUNTER
Patient calling in requesting COVID 19 results from  visit 5/22    Informed COVID and strep was negative.       MOON RodriguezN RN  [facility]

## 2023-01-07 ENCOUNTER — HEALTH MAINTENANCE LETTER (OUTPATIENT)
Age: 43
End: 2023-01-07

## 2023-04-22 ENCOUNTER — HEALTH MAINTENANCE LETTER (OUTPATIENT)
Age: 43
End: 2023-04-22

## 2024-02-10 ENCOUNTER — HEALTH MAINTENANCE LETTER (OUTPATIENT)
Age: 44
End: 2024-02-10

## 2024-06-29 ENCOUNTER — HEALTH MAINTENANCE LETTER (OUTPATIENT)
Age: 44
End: 2024-06-29

## 2024-09-25 NOTE — IP AVS SNAPSHOT
[facility]    [address] [address]    Phone:  [phone]                                       After Visit Summary   6/4/2018    Reyna Zhang    MRN: [id number]           After Visit Summary Signature Page     I have received my discharge instructions, and my questions have been answered. I have discussed any challenges I see with this plan with the nurse or doctor.    ..........................................................................................................................................  Patient/Patient Representative Signature      ..........................................................................................................................................  Patient Representative Print Name and Relationship to Patient    ..................................................               ................................................  Date                                            Time    ..........................................................................................................................................  Reviewed by Signature/Title    ...................................................              ..............................................  Date                                                            Time           No care due was identified.  [facility] Embedded Care Due Messages. Reference number: [id number].   9/25/2024 10:30:23 AM CDT

## (undated) DEVICE — STRAP KNEE/BODY 31143004

## (undated) DEVICE — DRSG STERI STRIP 1/2X4" R1547

## (undated) DEVICE — LIGHT HANDLE X2

## (undated) DEVICE — SYR BULB IRRIG 50ML LATEX FREE 0035280

## (undated) DEVICE — STPL SKIN 35W APPOSE 8886803712

## (undated) DEVICE — ESU ELEC BLADE 2.75" COATED/INSULATED E1455

## (undated) DEVICE — ESU PENCIL W/SMOKE EVAC NEPTUNE STRYKER 0703-046-000

## (undated) DEVICE — COVER CAMERA IN-LIGHT DISP LT-C02

## (undated) DEVICE — SUCTION TIP YANKAUER STR K87

## (undated) DEVICE — BLADE KNIFE SURG 10 371110

## (undated) DEVICE — BNDG ELASTIC 6" DBL LENGTH UNSTERILE 6611-16

## (undated) DEVICE — GOWN XLG DISP 9545

## (undated) DEVICE — SYR 10ML FINGER CONTROL W/O NDL 309695

## (undated) DEVICE — PEN MARKING SKIN W/LABELS 31145918

## (undated) DEVICE — LINEN BACK PACK 5440

## (undated) DEVICE — Device

## (undated) DEVICE — SU VICRYL 4-0 PS-2 18" UND J496H

## (undated) DEVICE — SU VICRYL 3-0 FS-1 27" J442H

## (undated) DEVICE — GLOVE PROTEXIS MICRO 6.5  2D73PM65

## (undated) DEVICE — LINEN DRAPE 54X72" 5467

## (undated) DEVICE — WIPES FOLEY CARE SURESTEP PROVON DFC100

## (undated) DEVICE — SOL ADH LIQUID BENZOIN SWAB 0.6ML C1544

## (undated) DEVICE — SU DERMABOND PRINEO 22CM CLR222US

## (undated) DEVICE — ESU GROUND PAD UNIVERSAL W/O CORD

## (undated) DEVICE — DRAPE LAP TRANSVERSE 29421

## (undated) DEVICE — SU PLAIN FAST ABSORB 5-0 PC-1 18" 1915G

## (undated) DEVICE — SOL NACL 0.9% IRRIG 1000ML BOTTLE 2F7124

## (undated) DEVICE — NDL 25GA 1.5" 305127

## (undated) DEVICE — BLADE KNIFE SURG 11 371111

## (undated) DEVICE — PAD CHUX UNDERPAD 30X30"

## (undated) DEVICE — SPONGE LAP 18X18" X8435

## (undated) DEVICE — TUBING SUCTION MEDI-VAC SOFT 3/16"X20' N520A

## (undated) DEVICE — CATH TRAY FOLEY SURESTEP 16FR WDRAIN BAG STLK LATEX A300316A

## (undated) DEVICE — DRSG KERLIX 4 1/2"X4YDS ROLL 6730

## (undated) RX ORDER — LIDOCAINE HYDROCHLORIDE AND EPINEPHRINE 10; 10 MG/ML; UG/ML
INJECTION, SOLUTION INFILTRATION; PERINEURAL
Status: DISPENSED
Start: 2018-06-04

## (undated) RX ORDER — BUPIVACAINE HYDROCHLORIDE AND EPINEPHRINE 5; 5 MG/ML; UG/ML
INJECTION, SOLUTION EPIDURAL; INTRACAUDAL; PERINEURAL
Status: DISPENSED
Start: 2018-06-04

## (undated) RX ORDER — LIDOCAINE HYDROCHLORIDE 20 MG/ML
INJECTION, SOLUTION EPIDURAL; INFILTRATION; INTRACAUDAL; PERINEURAL
Status: DISPENSED
Start: 2018-06-04

## (undated) RX ORDER — FENTANYL CITRATE 50 UG/ML
INJECTION, SOLUTION INTRAMUSCULAR; INTRAVENOUS
Status: DISPENSED
Start: 2018-06-04

## (undated) RX ORDER — SCOLOPAMINE TRANSDERMAL SYSTEM 1 MG/1
PATCH, EXTENDED RELEASE TRANSDERMAL
Status: DISPENSED
Start: 2018-06-04

## (undated) RX ORDER — DEXAMETHASONE SODIUM PHOSPHATE 4 MG/ML
INJECTION, SOLUTION INTRA-ARTICULAR; INTRALESIONAL; INTRAMUSCULAR; INTRAVENOUS; SOFT TISSUE
Status: DISPENSED
Start: 2018-06-04

## (undated) RX ORDER — CLINDAMYCIN PHOSPHATE 900 MG/50ML
INJECTION, SOLUTION INTRAVENOUS
Status: DISPENSED
Start: 2018-06-04

## (undated) RX ORDER — HYDROMORPHONE HYDROCHLORIDE 1 MG/ML
INJECTION, SOLUTION INTRAMUSCULAR; INTRAVENOUS; SUBCUTANEOUS
Status: DISPENSED
Start: 2018-06-04

## (undated) RX ORDER — OXYCODONE HYDROCHLORIDE 5 MG/1
TABLET ORAL
Status: DISPENSED
Start: 2018-06-04

## (undated) RX ORDER — BUPIVACAINE HYDROCHLORIDE 5 MG/ML
INJECTION, SOLUTION PERINEURAL
Status: DISPENSED
Start: 2018-06-04

## (undated) RX ORDER — PROPOFOL 10 MG/ML
INJECTION, EMULSION INTRAVENOUS
Status: DISPENSED
Start: 2018-06-04

## (undated) RX ORDER — HYDROMORPHONE HYDROCHLORIDE 2 MG/ML
INJECTION, SOLUTION INTRAMUSCULAR; INTRAVENOUS; SUBCUTANEOUS
Status: DISPENSED
Start: 2018-06-04

## (undated) RX ORDER — ONDANSETRON 2 MG/ML
INJECTION INTRAMUSCULAR; INTRAVENOUS
Status: DISPENSED
Start: 2018-06-04